# Patient Record
Sex: MALE | Race: WHITE | Employment: OTHER | URBAN - METROPOLITAN AREA
[De-identification: names, ages, dates, MRNs, and addresses within clinical notes are randomized per-mention and may not be internally consistent; named-entity substitution may affect disease eponyms.]

---

## 2020-01-16 ENCOUNTER — OFFICE VISIT (OUTPATIENT)
Dept: SURGERY | Age: 80
End: 2020-01-16
Payer: COMMERCIAL

## 2020-01-16 ENCOUNTER — TELEPHONE (OUTPATIENT)
Dept: SURGERY | Age: 80
End: 2020-01-16

## 2020-01-16 VITALS
DIASTOLIC BLOOD PRESSURE: 76 MMHG | HEART RATE: 63 BPM | SYSTOLIC BLOOD PRESSURE: 133 MMHG | OXYGEN SATURATION: 97 % | WEIGHT: 180 LBS | HEIGHT: 70 IN | TEMPERATURE: 97.8 F | BODY MASS INDEX: 25.77 KG/M2

## 2020-01-16 PROCEDURE — 99204 OFFICE O/P NEW MOD 45 MIN: CPT | Performed by: SURGERY

## 2020-01-16 RX ORDER — WARFARIN SODIUM 7.5 MG/1
7.5 TABLET ORAL DAILY
COMMUNITY

## 2020-01-16 RX ORDER — LISINOPRIL 5 MG/1
5 TABLET ORAL NIGHTLY
COMMUNITY

## 2020-01-16 SDOH — HEALTH STABILITY: MENTAL HEALTH: HOW MANY STANDARD DRINKS CONTAINING ALCOHOL DO YOU HAVE ON A TYPICAL DAY?: 1 OR 2

## 2020-01-16 SDOH — HEALTH STABILITY: MENTAL HEALTH: HOW OFTEN DO YOU HAVE A DRINK CONTAINING ALCOHOL?: 4 OR MORE TIMES A WEEK

## 2020-01-16 NOTE — PROGRESS NOTES
connections:     Talks on phone: Not on file     Gets together: Not on file     Attends Sikh service: Not on file     Active member of club or organization: Not on file     Attends meetings of clubs or organizations: Not on file     Relationship status: Not on file    Intimate partner violence:     Fear of current or ex partner: Not on file     Emotionally abused: Not on file     Physically abused: Not on file     Forced sexual activity: Not on file   Other Topics Concern    Not on file   Social History Narrative    Not on file           Review of Systems  Review of Systems -  General ROS: negative for - chills, fatigue or malaise  ENT ROS: negative for - hearing change, nasal congestion or nasal discharge  Allergy and Immunology ROS: negative for - hives, itchy/watery eyes or nasal congestion  Hematological and Lymphatic ROS: negative for - blood clots, blood transfusions, bruising or fatigue  Endocrine ROS: negative for - malaise/lethargy, mood swings, palpitations or polydipsia/polyuria  Breast ROS: negative for - new or changing breast lumps or nipple changes  Respiratory ROS: negative for - sputum changes, stridor, tachypnea or wheezing  Cardiovascular ROS: negative for - irregular heartbeat, loss of consciousness, murmur or orthopnea  Gastrointestinal ROS: negative for - constipation, diarrhea, gas/bloating, heartburn or hematemesis  Genito-Urinary ROS: negative for -  genital discharge, genital ulcers or hematuria  Musculoskeletal ROS: negative for - gait disturbance, muscle pain or muscular weakness    Physical exam:   /76 (Site: Left Upper Arm, Position: Sitting, Cuff Size: Medium Adult)   Pulse 63   Temp 97.8 °F (36.6 °C) (Oral)   Ht 5' 9.5\" (1.765 m)   Wt 180 lb (81.6 kg)   SpO2 97%   BMI 26.20 kg/m²   General appearance:  NAD  Pyscho/social: negative for tremors and hallucinations  Head: NCAT, PERRLA, EOMI, red conjunctiva  Neck: supple, no masses  Lungs: CTAB, equal chest rise bilateral  Heart: Reg rate  Abdomen: soft, minimally tender, nondistended, left  reducible hernia   Skin; no lesions  Gu: no cva tenderness  Extremities: extremities normal, atraumatic, no cyanosis or edema    Assessment:  70 y.o. male with recurrent left inguinal hernia     Plan: To OR   Discussed the risk, benefits and alternatives of surgery including wound infections, bleeding, scar and hernia formation and the risks of general anesthetic including MI, CVA, sudden death or reactions to anesthetic medications. The patient understands the risks and alternatives and the possibility of converting to an open procedure. All questions were answered to the patient's satisfaction and they freely signed the consent.         Raymond Roth MD  11:29 AM  1/16/2020

## 2020-01-20 ENCOUNTER — PREP FOR PROCEDURE (OUTPATIENT)
Dept: SURGERY | Age: 80
End: 2020-01-20

## 2020-01-20 RX ORDER — SODIUM CHLORIDE, SODIUM LACTATE, POTASSIUM CHLORIDE, CALCIUM CHLORIDE 600; 310; 30; 20 MG/100ML; MG/100ML; MG/100ML; MG/100ML
INJECTION, SOLUTION INTRAVENOUS CONTINUOUS
Status: CANCELLED | OUTPATIENT
Start: 2020-01-20

## 2020-01-20 RX ORDER — SODIUM CHLORIDE 0.9 % (FLUSH) 0.9 %
10 SYRINGE (ML) INJECTION EVERY 12 HOURS SCHEDULED
Status: CANCELLED | OUTPATIENT
Start: 2020-01-20

## 2020-01-20 RX ORDER — SODIUM CHLORIDE 0.9 % (FLUSH) 0.9 %
10 SYRINGE (ML) INJECTION PRN
Status: CANCELLED | OUTPATIENT
Start: 2020-01-20

## 2020-01-21 ENCOUNTER — TELEPHONE (OUTPATIENT)
Dept: SURGERY | Age: 80
End: 2020-01-21

## 2020-01-23 ENCOUNTER — HOSPITAL ENCOUNTER (OUTPATIENT)
Dept: PREADMISSION TESTING | Age: 80
Discharge: HOME | End: 2020-01-23
Payer: MEDICARE

## 2020-01-23 VITALS
WEIGHT: 184.13 LBS | OXYGEN SATURATION: 95 % | HEIGHT: 70 IN | HEART RATE: 79 BPM | RESPIRATION RATE: 16 BRPM | BODY MASS INDEX: 26.36 KG/M2 | DIASTOLIC BLOOD PRESSURE: 58 MMHG | SYSTOLIC BLOOD PRESSURE: 138 MMHG | TEMPERATURE: 97.3 F

## 2020-01-23 LAB
ANION GAP SERPL CALCULATED.3IONS-SCNC: 12 MMOL/L (ref 7–16)
APTT: 36.4 SEC (ref 24.5–35.1)
BUN BLDV-MCNC: 17 MG/DL (ref 8–23)
CALCIUM SERPL-MCNC: 9.5 MG/DL (ref 8.6–10.2)
CHLORIDE BLD-SCNC: 101 MMOL/L (ref 98–107)
CO2: 26 MMOL/L (ref 22–29)
CREAT SERPL-MCNC: 1 MG/DL (ref 0.7–1.2)
GFR AFRICAN AMERICAN: >60
GFR NON-AFRICAN AMERICAN: >60 ML/MIN/1.73
GLUCOSE BLD-MCNC: 138 MG/DL (ref 74–99)
HCT VFR BLD CALC: 40.2 % (ref 37–54)
HEMOGLOBIN: 13.4 G/DL (ref 12.5–16.5)
INR BLD: 1.8
MCH RBC QN AUTO: 30.7 PG (ref 26–35)
MCHC RBC AUTO-ENTMCNC: 33.3 % (ref 32–34.5)
MCV RBC AUTO: 92 FL (ref 80–99.9)
PDW BLD-RTO: 13.2 FL (ref 11.5–15)
PLATELET # BLD: 157 E9/L (ref 130–450)
PMV BLD AUTO: 10 FL (ref 7–12)
POTASSIUM REFLEX MAGNESIUM: 4.1 MMOL/L (ref 3.5–5)
PROTHROMBIN TIME: 20.7 SEC (ref 9.3–12.4)
RBC # BLD: 4.37 E12/L (ref 3.8–5.8)
SODIUM BLD-SCNC: 139 MMOL/L (ref 132–146)
WBC # BLD: 5.8 E9/L (ref 4.5–11.5)

## 2020-01-23 PROCEDURE — 85027 COMPLETE CBC AUTOMATED: CPT

## 2020-01-23 PROCEDURE — 85730 THROMBOPLASTIN TIME PARTIAL: CPT

## 2020-01-23 PROCEDURE — 36415 COLL VENOUS BLD VENIPUNCTURE: CPT

## 2020-01-23 PROCEDURE — 93005 ELECTROCARDIOGRAM TRACING: CPT | Performed by: ANESTHESIOLOGY

## 2020-01-23 PROCEDURE — 80048 BASIC METABOLIC PNL TOTAL CA: CPT

## 2020-01-23 PROCEDURE — 85610 PROTHROMBIN TIME: CPT

## 2020-01-23 ASSESSMENT — PAIN DESCRIPTION - LOCATION: LOCATION: GROIN

## 2020-01-23 ASSESSMENT — PAIN SCALES - GENERAL: PAINLEVEL_OUTOF10: 1

## 2020-01-23 ASSESSMENT — PAIN DESCRIPTION - ONSET: ONSET: ON-GOING

## 2020-01-23 ASSESSMENT — PAIN DESCRIPTION - ORIENTATION: ORIENTATION: LEFT

## 2020-01-23 ASSESSMENT — PAIN DESCRIPTION - DESCRIPTORS: DESCRIPTORS: PRESSURE

## 2020-01-23 ASSESSMENT — PAIN DESCRIPTION - PAIN TYPE: TYPE: CHRONIC PAIN

## 2020-01-23 NOTE — PROGRESS NOTES
no eye makeup) or nail polish on your fingers or toes. 11. DO NOT wear any jewelry or piercings on day of surgery. All body piercing jewelry must be removed. 12. Shower the night before surgery with _x__Antibacterial soap /AMISH WIPES________    13. TOTAL JOINT REPLACEMENT/HYSTERECTOMY PATIENTS ONLY---Remember to bring Blood Bank bracelet to the hospital on the day of surgery. 14. If you have a Living Will and Durable Power of  for Healthcare, please bring in a copy. 15. If appropriate bring crutches, inspirex, WALKER, CANE etc... 12. Notify your Surgeon if you develop any illness between now and surgery time, cough, cold, fever, sore throat, nausea, vomiting, etc.  Please notify your surgeon if you experience dizziness, shortness of breath or blurred vision between now & the time of your surgery. 17. If you have _x__dentures, they will be removed before going to the OR; we will provide you a container. If you wear ___contact lenses or _x__glasses, they will be removed; please bring a case for them. 18. To provide excellent care visitors will be limited to 2 in the room at any given time. 19. Please bring picture ID and insurance card. 20. Sleep apnea patients need to bring CPAP AND SETTINGS to hospital on day of surgery. 21. During flu season no children under the age of 15 are permitted in the hospital for the safety of all patients. 22. Other                  Please call AMBULATORY CARE if you have any further questions.    [facility]     [address]

## 2020-01-24 LAB
EKG ATRIAL RATE: 67 BPM
EKG P AXIS: 70 DEGREES
EKG P-R INTERVAL: 172 MS
EKG Q-T INTERVAL: 434 MS
EKG QRS DURATION: 68 MS
EKG QTC CALCULATION (BAZETT): 458 MS
EKG R AXIS: 54 DEGREES
EKG T AXIS: 86 DEGREES
EKG VENTRICULAR RATE: 67 BPM

## 2020-01-24 PROCEDURE — 93010 ELECTROCARDIOGRAM REPORT: CPT | Performed by: INTERNAL MEDICINE

## 2020-01-29 ENCOUNTER — HOSPITAL ENCOUNTER (OUTPATIENT)
Age: 80
Setting detail: OUTPATIENT SURGERY
Discharge: HOME | End: 2020-01-29
Attending: SURGERY | Admitting: SURGERY
Payer: MEDICARE

## 2020-01-29 ENCOUNTER — ANESTHESIA (OUTPATIENT)
Dept: OPERATING ROOM | Age: 80
End: 2020-01-29
Payer: MEDICARE

## 2020-01-29 ENCOUNTER — ANESTHESIA EVENT (OUTPATIENT)
Dept: OPERATING ROOM | Age: 80
End: 2020-01-29
Payer: MEDICARE

## 2020-01-29 VITALS
SYSTOLIC BLOOD PRESSURE: 130 MMHG | TEMPERATURE: 96.9 F | OXYGEN SATURATION: 94 % | WEIGHT: 186 LBS | BODY MASS INDEX: 26.63 KG/M2 | RESPIRATION RATE: 16 BRPM | HEIGHT: 70 IN | HEART RATE: 66 BPM | DIASTOLIC BLOOD PRESSURE: 70 MMHG

## 2020-01-29 VITALS
SYSTOLIC BLOOD PRESSURE: 187 MMHG | DIASTOLIC BLOOD PRESSURE: 111 MMHG | OXYGEN SATURATION: 99 % | RESPIRATION RATE: 1 BRPM | TEMPERATURE: 95.2 F

## 2020-01-29 LAB
APTT: 31.8 SEC (ref 24.5–35.1)
INR BLD: 1
PROTHROMBIN TIME: 11.3 SEC (ref 9.3–12.4)

## 2020-01-29 PROCEDURE — C1781 MESH (IMPLANTABLE): HCPCS | Performed by: SURGERY

## 2020-01-29 PROCEDURE — 2500000003 HC RX 250 WO HCPCS: Performed by: SURGERY

## 2020-01-29 PROCEDURE — 3600000014 HC SURGERY LEVEL 4 ADDTL 15MIN: Performed by: SURGERY

## 2020-01-29 PROCEDURE — 7100000000 HC PACU RECOVERY - FIRST 15 MIN: Performed by: SURGERY

## 2020-01-29 PROCEDURE — 6360000002 HC RX W HCPCS: Performed by: SURGERY

## 2020-01-29 PROCEDURE — 49651 LAP ING HERNIA REPAIR RECUR: CPT | Performed by: SURGERY

## 2020-01-29 PROCEDURE — 36415 COLL VENOUS BLD VENIPUNCTURE: CPT

## 2020-01-29 PROCEDURE — 2580000003 HC RX 258: Performed by: SURGERY

## 2020-01-29 PROCEDURE — 6360000002 HC RX W HCPCS: Performed by: ANESTHESIOLOGY

## 2020-01-29 PROCEDURE — 6360000002 HC RX W HCPCS

## 2020-01-29 PROCEDURE — 7100000001 HC PACU RECOVERY - ADDTL 15 MIN: Performed by: SURGERY

## 2020-01-29 PROCEDURE — 7100000010 HC PHASE II RECOVERY - FIRST 15 MIN: Performed by: SURGERY

## 2020-01-29 PROCEDURE — 3700000001 HC ADD 15 MINUTES (ANESTHESIA): Performed by: SURGERY

## 2020-01-29 PROCEDURE — 85730 THROMBOPLASTIN TIME PARTIAL: CPT

## 2020-01-29 PROCEDURE — 2500000003 HC RX 250 WO HCPCS: Performed by: NURSE ANESTHETIST, CERTIFIED REGISTERED

## 2020-01-29 PROCEDURE — 2720000010 HC SURG SUPPLY STERILE: Performed by: SURGERY

## 2020-01-29 PROCEDURE — 6360000002 HC RX W HCPCS: Performed by: NURSE ANESTHETIST, CERTIFIED REGISTERED

## 2020-01-29 PROCEDURE — 3700000000 HC ANESTHESIA ATTENDED CARE: Performed by: SURGERY

## 2020-01-29 PROCEDURE — 7100000011 HC PHASE II RECOVERY - ADDTL 15 MIN: Performed by: SURGERY

## 2020-01-29 PROCEDURE — 3600000004 HC SURGERY LEVEL 4 BASE: Performed by: SURGERY

## 2020-01-29 PROCEDURE — 2709999900 HC NON-CHARGEABLE SUPPLY: Performed by: SURGERY

## 2020-01-29 PROCEDURE — 85610 PROTHROMBIN TIME: CPT

## 2020-01-29 DEVICE — MESH SURG XL W4.8XL6.7IN L L PORE LTWT FOR INGUINAL HERN: Type: IMPLANTABLE DEVICE | Site: INGUINAL | Status: FUNCTIONAL

## 2020-01-29 RX ORDER — TRAMADOL HYDROCHLORIDE 50 MG/1
50 TABLET ORAL EVERY 6 HOURS PRN
Qty: 12 TABLET | Refills: 0 | Status: SHIPPED | OUTPATIENT
Start: 2020-01-29 | End: 2020-02-01

## 2020-01-29 RX ORDER — MIDAZOLAM HYDROCHLORIDE 1 MG/ML
INJECTION INTRAMUSCULAR; INTRAVENOUS PRN
Status: DISCONTINUED | OUTPATIENT
Start: 2020-01-29 | End: 2020-01-29 | Stop reason: SDUPTHER

## 2020-01-29 RX ORDER — MEPERIDINE HYDROCHLORIDE 25 MG/ML
12.5 INJECTION INTRAMUSCULAR; INTRAVENOUS; SUBCUTANEOUS EVERY 5 MIN PRN
Status: DISCONTINUED | OUTPATIENT
Start: 2020-01-29 | End: 2020-01-29 | Stop reason: HOSPADM

## 2020-01-29 RX ORDER — IBUPROFEN 800 MG/1
800 TABLET ORAL EVERY 6 HOURS PRN
Qty: 20 TABLET | Refills: 0 | Status: SHIPPED | OUTPATIENT
Start: 2020-01-29

## 2020-01-29 RX ORDER — PROPOFOL 10 MG/ML
INJECTION, EMULSION INTRAVENOUS PRN
Status: DISCONTINUED | OUTPATIENT
Start: 2020-01-29 | End: 2020-01-29 | Stop reason: SDUPTHER

## 2020-01-29 RX ORDER — HYDROMORPHONE HYDROCHLORIDE 1 MG/ML
0.5 INJECTION, SOLUTION INTRAMUSCULAR; INTRAVENOUS; SUBCUTANEOUS EVERY 5 MIN PRN
Status: DISCONTINUED | OUTPATIENT
Start: 2020-01-29 | End: 2020-01-29 | Stop reason: HOSPADM

## 2020-01-29 RX ORDER — HYDRALAZINE HYDROCHLORIDE 20 MG/ML
5 INJECTION INTRAMUSCULAR; INTRAVENOUS EVERY 10 MIN PRN
Status: DISCONTINUED | OUTPATIENT
Start: 2020-01-29 | End: 2020-01-29 | Stop reason: HOSPADM

## 2020-01-29 RX ORDER — SODIUM CHLORIDE, SODIUM LACTATE, POTASSIUM CHLORIDE, CALCIUM CHLORIDE 600; 310; 30; 20 MG/100ML; MG/100ML; MG/100ML; MG/100ML
INJECTION, SOLUTION INTRAVENOUS CONTINUOUS
Status: DISCONTINUED | OUTPATIENT
Start: 2020-01-29 | End: 2020-01-29 | Stop reason: HOSPADM

## 2020-01-29 RX ORDER — HYDROMORPHONE HYDROCHLORIDE 1 MG/ML
0.25 INJECTION, SOLUTION INTRAMUSCULAR; INTRAVENOUS; SUBCUTANEOUS EVERY 5 MIN PRN
Status: DISCONTINUED | OUTPATIENT
Start: 2020-01-29 | End: 2020-01-29 | Stop reason: HOSPADM

## 2020-01-29 RX ORDER — LABETALOL HYDROCHLORIDE 5 MG/ML
5 INJECTION, SOLUTION INTRAVENOUS EVERY 10 MIN PRN
Status: DISCONTINUED | OUTPATIENT
Start: 2020-01-29 | End: 2020-01-29 | Stop reason: HOSPADM

## 2020-01-29 RX ORDER — FENTANYL CITRATE 50 UG/ML
INJECTION, SOLUTION INTRAMUSCULAR; INTRAVENOUS PRN
Status: DISCONTINUED | OUTPATIENT
Start: 2020-01-29 | End: 2020-01-29 | Stop reason: SDUPTHER

## 2020-01-29 RX ORDER — DEXAMETHASONE SODIUM PHOSPHATE 10 MG/ML
INJECTION, SOLUTION INTRAMUSCULAR; INTRAVENOUS PRN
Status: DISCONTINUED | OUTPATIENT
Start: 2020-01-29 | End: 2020-01-29 | Stop reason: SDUPTHER

## 2020-01-29 RX ORDER — LIDOCAINE HYDROCHLORIDE 20 MG/ML
INJECTION, SOLUTION INTRAVENOUS PRN
Status: DISCONTINUED | OUTPATIENT
Start: 2020-01-29 | End: 2020-01-29 | Stop reason: SDUPTHER

## 2020-01-29 RX ORDER — ONDANSETRON 2 MG/ML
INJECTION INTRAMUSCULAR; INTRAVENOUS PRN
Status: DISCONTINUED | OUTPATIENT
Start: 2020-01-29 | End: 2020-01-29 | Stop reason: SDUPTHER

## 2020-01-29 RX ORDER — BUPIVACAINE HYDROCHLORIDE AND EPINEPHRINE 2.5; 5 MG/ML; UG/ML
INJECTION, SOLUTION EPIDURAL; INFILTRATION; INTRACAUDAL; PERINEURAL PRN
Status: DISCONTINUED | OUTPATIENT
Start: 2020-01-29 | End: 2020-01-29 | Stop reason: ALTCHOICE

## 2020-01-29 RX ORDER — ONDANSETRON 2 MG/ML
4 INJECTION INTRAMUSCULAR; INTRAVENOUS
Status: DISCONTINUED | OUTPATIENT
Start: 2020-01-29 | End: 2020-01-29 | Stop reason: HOSPADM

## 2020-01-29 RX ORDER — SODIUM CHLORIDE 0.9 % (FLUSH) 0.9 %
10 SYRINGE (ML) INJECTION EVERY 12 HOURS SCHEDULED
Status: DISCONTINUED | OUTPATIENT
Start: 2020-01-29 | End: 2020-01-29 | Stop reason: HOSPADM

## 2020-01-29 RX ORDER — NEOSTIGMINE METHYLSULFATE 1 MG/ML
INJECTION, SOLUTION INTRAVENOUS PRN
Status: DISCONTINUED | OUTPATIENT
Start: 2020-01-29 | End: 2020-01-29 | Stop reason: SDUPTHER

## 2020-01-29 RX ORDER — GLYCOPYRROLATE 1 MG/5 ML
SYRINGE (ML) INTRAVENOUS PRN
Status: DISCONTINUED | OUTPATIENT
Start: 2020-01-29 | End: 2020-01-29 | Stop reason: SDUPTHER

## 2020-01-29 RX ORDER — PROMETHAZINE HYDROCHLORIDE 25 MG/ML
6.25 INJECTION, SOLUTION INTRAMUSCULAR; INTRAVENOUS
Status: DISCONTINUED | OUTPATIENT
Start: 2020-01-29 | End: 2020-01-29 | Stop reason: HOSPADM

## 2020-01-29 RX ORDER — CEFAZOLIN SODIUM 2 G/50ML
2 SOLUTION INTRAVENOUS
Status: COMPLETED | OUTPATIENT
Start: 2020-01-29 | End: 2020-01-29

## 2020-01-29 RX ORDER — SODIUM CHLORIDE 0.9 % (FLUSH) 0.9 %
10 SYRINGE (ML) INJECTION PRN
Status: DISCONTINUED | OUTPATIENT
Start: 2020-01-29 | End: 2020-01-29 | Stop reason: HOSPADM

## 2020-01-29 RX ORDER — ROCURONIUM BROMIDE 10 MG/ML
INJECTION, SOLUTION INTRAVENOUS PRN
Status: DISCONTINUED | OUTPATIENT
Start: 2020-01-29 | End: 2020-01-29 | Stop reason: SDUPTHER

## 2020-01-29 RX ADMIN — SODIUM CHLORIDE, POTASSIUM CHLORIDE, SODIUM LACTATE AND CALCIUM CHLORIDE: 600; 310; 30; 20 INJECTION, SOLUTION INTRAVENOUS at 06:19

## 2020-01-29 RX ADMIN — HYDROMORPHONE HYDROCHLORIDE 0.25 MG: 1 INJECTION, SOLUTION INTRAMUSCULAR; INTRAVENOUS; SUBCUTANEOUS at 09:33

## 2020-01-29 RX ADMIN — HYDROMORPHONE HYDROCHLORIDE 0.25 MG: 1 INJECTION, SOLUTION INTRAMUSCULAR; INTRAVENOUS; SUBCUTANEOUS at 09:25

## 2020-01-29 RX ADMIN — FENTANYL CITRATE 50 MCG: 50 INJECTION, SOLUTION INTRAMUSCULAR; INTRAVENOUS at 08:11

## 2020-01-29 RX ADMIN — ONDANSETRON HYDROCHLORIDE 4 MG: 2 INJECTION, SOLUTION INTRAMUSCULAR; INTRAVENOUS at 08:25

## 2020-01-29 RX ADMIN — FENTANYL CITRATE 50 MCG: 50 INJECTION, SOLUTION INTRAMUSCULAR; INTRAVENOUS at 08:32

## 2020-01-29 RX ADMIN — LIDOCAINE HYDROCHLORIDE 50 MG: 20 INJECTION, SOLUTION INTRAVENOUS at 08:03

## 2020-01-29 RX ADMIN — Medication 0.6 MG: at 08:27

## 2020-01-29 RX ADMIN — Medication 3 MG: at 08:27

## 2020-01-29 RX ADMIN — SODIUM CHLORIDE, POTASSIUM CHLORIDE, SODIUM LACTATE AND CALCIUM CHLORIDE: 600; 310; 30; 20 INJECTION, SOLUTION INTRAVENOUS at 08:37

## 2020-01-29 RX ADMIN — FENTANYL CITRATE 50 MCG: 50 INJECTION, SOLUTION INTRAMUSCULAR; INTRAVENOUS at 08:28

## 2020-01-29 RX ADMIN — MIDAZOLAM 2 MG: 1 INJECTION INTRAMUSCULAR; INTRAVENOUS at 07:58

## 2020-01-29 RX ADMIN — Medication 30 MG: at 08:03

## 2020-01-29 RX ADMIN — DEXAMETHASONE SODIUM PHOSPHATE 10 MG: 10 INJECTION, SOLUTION INTRAMUSCULAR; INTRAVENOUS at 08:03

## 2020-01-29 RX ADMIN — PROPOFOL 200 MG: 10 INJECTION, EMULSION INTRAVENOUS at 08:03

## 2020-01-29 RX ADMIN — FENTANYL CITRATE 50 MCG: 50 INJECTION, SOLUTION INTRAMUSCULAR; INTRAVENOUS at 08:03

## 2020-01-29 RX ADMIN — CEFAZOLIN SODIUM 2 G: 2 SOLUTION INTRAVENOUS at 07:58

## 2020-01-29 RX ADMIN — FENTANYL CITRATE 50 MCG: 50 INJECTION, SOLUTION INTRAMUSCULAR; INTRAVENOUS at 08:24

## 2020-01-29 RX ADMIN — SODIUM CHLORIDE, POTASSIUM CHLORIDE, SODIUM LACTATE AND CALCIUM CHLORIDE: 600; 310; 30; 20 INJECTION, SOLUTION INTRAVENOUS at 07:58

## 2020-01-29 ASSESSMENT — PULMONARY FUNCTION TESTS
PIF_VALUE: 1
PIF_VALUE: 15
PIF_VALUE: 0
PIF_VALUE: 13
PIF_VALUE: 3
PIF_VALUE: 3
PIF_VALUE: 19
PIF_VALUE: 7
PIF_VALUE: 19
PIF_VALUE: 15
PIF_VALUE: 16
PIF_VALUE: 2
PIF_VALUE: 0
PIF_VALUE: 14
PIF_VALUE: 19
PIF_VALUE: 15
PIF_VALUE: 25
PIF_VALUE: 0
PIF_VALUE: 13
PIF_VALUE: 18
PIF_VALUE: 14
PIF_VALUE: 14
PIF_VALUE: 0
PIF_VALUE: 2
PIF_VALUE: 19
PIF_VALUE: 14
PIF_VALUE: 14
PIF_VALUE: 19
PIF_VALUE: 1
PIF_VALUE: 19
PIF_VALUE: 21

## 2020-01-29 ASSESSMENT — PAIN DESCRIPTION - PROGRESSION
CLINICAL_PROGRESSION: NOT CHANGED
CLINICAL_PROGRESSION: NOT CHANGED
CLINICAL_PROGRESSION: GRADUALLY WORSENING
CLINICAL_PROGRESSION: NOT CHANGED

## 2020-01-29 ASSESSMENT — PAIN DESCRIPTION - LOCATION
LOCATION: GROIN
LOCATION: ABDOMEN
LOCATION: ABDOMEN
LOCATION: GROIN
LOCATION: ABDOMEN

## 2020-01-29 ASSESSMENT — PAIN SCALES - GENERAL
PAINLEVEL_OUTOF10: 4
PAINLEVEL_OUTOF10: 5
PAINLEVEL_OUTOF10: 0
PAINLEVEL_OUTOF10: 5
PAINLEVEL_OUTOF10: 0
PAINLEVEL_OUTOF10: 0

## 2020-01-29 ASSESSMENT — PAIN DESCRIPTION - DESCRIPTORS
DESCRIPTORS: SORE
DESCRIPTORS: SORE
DESCRIPTORS: ACHING

## 2020-01-29 ASSESSMENT — PAIN DESCRIPTION - PAIN TYPE
TYPE: SURGICAL PAIN

## 2020-01-29 ASSESSMENT — LIFESTYLE VARIABLES: SMOKING_STATUS: 1

## 2020-01-29 ASSESSMENT — PAIN DESCRIPTION - ORIENTATION
ORIENTATION: LEFT
ORIENTATION: LEFT

## 2020-01-29 ASSESSMENT — PAIN - FUNCTIONAL ASSESSMENT: PAIN_FUNCTIONAL_ASSESSMENT: 0-10

## 2020-01-29 NOTE — ANESTHESIA POSTPROCEDURE EVALUATION
Department of Anesthesiology  Postprocedure Note    Patient: Chela Tovar  MRN: [id number]  YOB: 1940  Date of evaluation: 1/29/2020  Time:  10:26 AM     Procedure Summary     Date:  01/29/20 Room / Location:  [address] [place] [address] 06 / [facility]    Anesthesia Start:  3550 Anesthesia Stop:  6348    Procedure:  LAPAROSCOPIC LEFT INGUINAL HERNIA REPAIR WITH MESH (Left ) Diagnosis:  (LEFT INGUINAL HERNIA)    Surgeon:  Brandin Parks MD Responsible Provider:  Reg Walters MD    Anesthesia Type:  general ASA Status:  3          Anesthesia Type: general    Amaury Phase I: Amaury Score: 10    Amaury Phase II: Amaury Score: 10    Last vitals: Reviewed and per EMR flowsheets.        Anesthesia Post Evaluation    Patient location during evaluation: PACU  Patient participation: complete - patient participated  Level of consciousness: awake and alert  Pain score: 0  Airway patency: patent  Nausea & Vomiting: no vomiting and no nausea  Complications: no  Cardiovascular status: hemodynamically stable  Respiratory status: acceptable  Hydration status: stable

## 2020-01-29 NOTE — ANESTHESIA PRE PROCEDURE
Department of Anesthesiology  Preprocedure Note       Name:  Kristina Mojica   Age:  70 y.o.  :  1940                                          MRN:  [id number]         Date:  2020      Surgeon: Alyssia Presley):  Elina Toro MD    Procedure: LAPAROSCOPIC, POSS OPEN LEFT INGUINAL HERNIA REPAIR WITH MESH (Left )    Medications prior to admission:   Prior to Admission medications    Medication Sig Start Date End Date Taking? Authorizing Provider   lisinopril (PRINIVIL;ZESTRIL) 5 MG tablet Take 5 mg by mouth nightly    Yes Historical Provider, MD   Simvastatin (ZOCOR PO) Take 40 mg by mouth nightly    Yes Historical Provider, MD   warfarin (COUMADIN) 7.5 MG tablet Take 7.5 mg by mouth daily     Historical Provider, MD       Current medications:    Current Facility-Administered Medications   Medication Dose Route Frequency Provider Last Rate Last Dose    ceFAZolin (ANCEF) 2 g in dextrose 3 % 50 mL IVPB (duplex)  2 g Intravenous On Call to [address] [place] [address], MD        lactated ringers infusion   Intravenous Continuous Elina Toro  mL/hr at 20 3581      sodium chloride flush 0.9 % injection 10 mL  10 mL Intravenous 2 times per day Elina Toro MD        sodium chloride flush 0.9 % injection 10 mL  10 mL Intravenous PRN Elina Toro MD           Allergies:  No Known Allergies    Problem List:  There is no problem list on file for this patient.       Past Medical History:        Diagnosis Date    Cerebral artery occlusion with cerebral infarction ([address]     left side weaker than right    [place] (hard of hearing)     Hx of blood clots     Hyperlipidemia     Hypertension        Past Surgical History:        Procedure Laterality Date    FRACTURE SURGERY      hand left    HERNIA REPAIR      TONSILLECTOMY         Social History:    Social History     Tobacco Use    Smoking status: Heavy Tobacco Smoker     Packs/day: 1.00     Types: Cigarettes    Smokeless tobacco: Never Used   Substance Use Topics    Alcohol use: Yes     Alcohol/week: 5.0 standard drinks     Types: 5 Cans of beer per week     Frequency: 4 or more times a week     Drinks per session: 1 or 2     Binge frequency: Daily or almost daily     Comment: occas. Ready to quit: Not Answered  Counseling given: Not Answered      Vital Signs (Current):   Vitals:    01/29/20 0601   BP: 134/68   Pulse: 77   Resp: 16   Temp: 97.4 °F (36.3 °C)   TempSrc: Temporal   SpO2: 95%   Weight: 186 lb (84.4 kg)   Height: 5' 9.5\" (1.765 m)                                              BP Readings from Last 3 Encounters:   01/29/20 134/68   01/23/20 (!) 138/58   01/16/20 133/76       NPO Status: Time of last liquid consumption: 2100                        Time of last solid consumption: 2100                        Date of last liquid consumption: 01/28/20                        Date of last solid food consumption: 01/28/20    BMI:   Wt Readings from Last 3 Encounters:   01/29/20 186 lb (84.4 kg)   01/23/20 184 lb 2 oz (83.5 kg)   01/16/20 180 lb (81.6 kg)     Body mass index is 27.07 kg/m². CBC:   Lab Results   Component Value Date    WBC 5.8 01/23/2020    RBC 4.37 01/23/2020    HGB 13.4 01/23/2020    HCT 40.2 01/23/2020    MCV 92.0 01/23/2020    RDW 13.2 01/23/2020     01/23/2020       CMP:   Lab Results   Component Value Date     01/23/2020    K 4.1 01/23/2020     01/23/2020    CO2 26 01/23/2020    BUN 17 01/23/2020    CREATININE 1.0 01/23/2020    GFRAA >60 01/23/2020    LABGLOM >60 01/23/2020    GLUCOSE 138 01/23/2020    PROT 7.3 09/20/2017    CALCIUM 9.5 01/23/2020    BILITOT 0.4 09/20/2017    ALKPHOS 43 09/20/2017    AST 14 09/20/2017    ALT 11 09/20/2017       POC Tests: No results for input(s): POCGLU, POCNA, POCK, POCCL, POCBUN, POCHEMO, POCHCT in the last 72 hours.     Coags:   Lab Results   Component Value Date    PROTIME 11.3 01/29/2020    INR 1.0 01/29/2020    APTT 31.8 01/29/2020       HCG (If

## 2020-01-29 NOTE — OP NOTE
DATE OF PROCEDURE:  1/29/2020    SURGEON: LES Hyltone: edith. PREOPERATIVE DIAGNOSES: recurrent left inguinal hernia, possible bilateral     POSTOPERATIVE DIAGNOSIS: recurrent left inguinal hernia      OPERATION: 1.)Laparoscopic transabdominal recurrent left inguinal hernia repair with   Mesh  2.) Excision of cord lipoma     ANESTHESIA: General.     ESTIMATED BLOOD LOSS: Minimal.     COMPLICATIONS: None. FLUIDS: Crystalloid. SPECIMEN: None. DISPOSITION: Discharged home. INDICATION FOR SURGERY: This is a 82-year-old male   who presented with left inguinal swelling and complaints of pain consistent with a left inguinal hernia that was verified with physical exam. We explained the risks and benefits,   potential outcomes and alternatives of treatment of the aforementioned   treatment, including risk of opening surgical site, infection, mesh infection   and needing removal, conversion to open procedure and he agreed to proceed understanding those risks and potential outcomes. PROCEDURE: The patient was brought into the operating suite, had bilateral   PCDs placed, was on preoperative antibiotics, was placed under general   anesthesia, and was then prepped and draped in normal sterile condition. Once this was done, a 5-mm incision was made infraumbilically. Veress needle   was passed into the peritoneum. Meniscus test verified proper location. CO2   was used to insufflate the abdomen to a pressure of 15 mmHg. At that time,   the Veress needle was removed and a 5-mm trocar was put into its place. A   laparoscope was introduced through this trocar and under direct visualization   with the laparoscope, 2 additional 5-mm trocars were placed, 1 on the right   abdomen 4 fingerbreadths lateral into the same line as the umbilicus, and   then a similar mirror image on the left side. Once all 3 ports were in   placed, we explored the abdomen.  There was an obvious left indirect inguinal hernia. We began our dissection by taking the scissors and making a peritoneal   incision more proximal on the abdominal wall from the hernia site. Once we   were able to get into the preperitoneal space, we bluntly dissected out the   preperitoneal space down to the hernia sac. The hernia sac was then bluntly   dissected free from the spermatic cord and its contents. A cord lipoma was dissected free and excised. We were able to   visualize the vas deferens and pay close attention to not injure this as well   as the vasculature of the cord contents. Once we were able to reduce the   hernia and free the peritoneum from the spermatic cord, and fully reduce the   hernia, we brought in a Bard 3DMax Light mesh, introduced it through one of   the trocars and then placed it into the left inguinal area. Once this was   done, it was tacked to the pubic tubercle medially and a few other   stabilizing tacks were placed throughout the mesh. These were paid close   attention to not place any so-called \"triangle of doom\" or \"triangle of pain\". Once this was completed, the peritoneum was then tacked upon itself to close   over the mesh to assure no bowel would come in contact with the mesh. Once this was completed and we were content with the placement of the mesh,   the pneumoperitoneum was evacuated. The trocars were removed. At that   point, the skin was approximated in all trocar sites with 4-0 Monocryl   sutures in a subcuticular fashion. The patient was woken up in stable   condition and taken to PACU for postoperative care.       Holden Ariza MD  8:28 AM  1/29/2020

## 2020-01-29 NOTE — H&P
Never   Lifestyle    Physical activity:       Days per week: Not on file       Minutes per session: Not on file    Stress: Not on file   Relationships    Social connections:       Talks on phone: Not on file       Gets together: Not on file       Attends Sabianist service: Not on file       Active member of club or organization: Not on file       Attends meetings of clubs or organizations: Not on file       Relationship status: Not on file    Intimate partner violence:       Fear of current or ex partner: Not on file       Emotionally abused: Not on file       Physically abused: Not on file       Forced sexual activity: Not on file   Other Topics Concern    Not on file   Social History Narrative    Not on file                  Review of Systems  Review of Systems -  General ROS: negative for - chills, fatigue or malaise  ENT ROS: negative for - hearing change, nasal congestion or nasal discharge  Allergy and Immunology ROS: negative for - hives, itchy/watery eyes or nasal congestion  Hematological and Lymphatic ROS: negative for - blood clots, blood transfusions, bruising or fatigue  Endocrine ROS: negative for - malaise/lethargy, mood swings, palpitations or polydipsia/polyuria  Breast ROS: negative for - new or changing breast lumps or nipple changes  Respiratory ROS: negative for - sputum changes, stridor, tachypnea or wheezing  Cardiovascular ROS: negative for - irregular heartbeat, loss of consciousness, murmur or orthopnea  Gastrointestinal ROS: negative for - constipation, diarrhea, gas/bloating, heartburn or hematemesis  Genito-Urinary ROS: negative for -  genital discharge, genital ulcers or hematuria  Musculoskeletal ROS: negative for - gait disturbance, muscle pain or muscular weakness     Physical exam:   /68   Pulse 77   Temp 97.4 °F (36.3 °C) (Temporal)   Resp 16   Ht 5' 9.5\" (1.765 m)   Wt 186 lb (84.4 kg)   SpO2 95%   BMI 27.07 kg/m²     General appearance:  NAD  Pyscho/social:

## 2020-02-13 ENCOUNTER — OFFICE VISIT (OUTPATIENT)
Dept: SURGERY | Age: 80
End: 2020-02-13

## 2020-02-13 VITALS
TEMPERATURE: 98.1 F | BODY MASS INDEX: 26.63 KG/M2 | DIASTOLIC BLOOD PRESSURE: 68 MMHG | WEIGHT: 186 LBS | HEART RATE: 71 BPM | HEIGHT: 70 IN | RESPIRATION RATE: 17 BRPM | SYSTOLIC BLOOD PRESSURE: 120 MMHG

## 2020-02-13 PROCEDURE — 99024 POSTOP FOLLOW-UP VISIT: CPT | Performed by: SURGERY

## 2023-11-12 ENCOUNTER — HOSPITAL ENCOUNTER (INPATIENT)
Age: 83
LOS: 4 days | Discharge: HOME | End: 2023-11-17
Attending: STUDENT IN AN ORGANIZED HEALTH CARE EDUCATION/TRAINING PROGRAM | Admitting: FAMILY MEDICINE
Payer: MEDICARE

## 2023-11-12 ENCOUNTER — APPOINTMENT (OUTPATIENT)
Dept: GENERAL RADIOLOGY | Age: 83
End: 2023-11-12
Payer: MEDICARE

## 2023-11-12 DIAGNOSIS — J18.9 PNEUMONIA DUE TO INFECTIOUS ORGANISM, UNSPECIFIED LATERALITY, UNSPECIFIED PART OF LUNG: ICD-10-CM

## 2023-11-12 DIAGNOSIS — R09.02 HYPOXIA: ICD-10-CM

## 2023-11-12 DIAGNOSIS — U07.1 COVID: Primary | ICD-10-CM

## 2023-11-12 PROCEDURE — 85610 PROTHROMBIN TIME: CPT

## 2023-11-12 PROCEDURE — 85379 FIBRIN DEGRADATION QUANT: CPT

## 2023-11-12 PROCEDURE — 80053 COMPREHEN METABOLIC PANEL: CPT

## 2023-11-12 PROCEDURE — 71045 X-RAY EXAM CHEST 1 VIEW: CPT

## 2023-11-12 PROCEDURE — 83605 ASSAY OF LACTIC ACID: CPT

## 2023-11-12 PROCEDURE — 87502 INFLUENZA DNA AMP PROBE: CPT

## 2023-11-12 PROCEDURE — 36415 COLL VENOUS BLD VENIPUNCTURE: CPT

## 2023-11-12 PROCEDURE — 85025 COMPLETE CBC W/AUTO DIFF WBC: CPT

## 2023-11-12 PROCEDURE — 87635 SARS-COV-2 COVID-19 AMP PRB: CPT

## 2023-11-12 PROCEDURE — 83735 ASSAY OF MAGNESIUM: CPT

## 2023-11-12 PROCEDURE — 84484 ASSAY OF TROPONIN QUANT: CPT

## 2023-11-12 PROCEDURE — 99285 EMERGENCY DEPT VISIT HI MDM: CPT

## 2023-11-12 PROCEDURE — 96365 THER/PROPH/DIAG IV INF INIT: CPT

## 2023-11-12 PROCEDURE — 93005 ELECTROCARDIOGRAM TRACING: CPT

## 2023-11-12 PROCEDURE — 96366 THER/PROPH/DIAG IV INF ADDON: CPT

## 2023-11-12 PROCEDURE — 96375 TX/PRO/DX INJ NEW DRUG ADDON: CPT

## 2023-11-13 ENCOUNTER — APPOINTMENT (OUTPATIENT)
Dept: CT IMAGING | Age: 83
End: 2023-11-13
Payer: MEDICARE

## 2023-11-13 PROBLEM — J12.82 PNEUMONIA DUE TO COVID-19 VIRUS: Status: ACTIVE | Noted: 2023-11-13

## 2023-11-13 PROBLEM — U07.1 PNEUMONIA DUE TO COVID-19 VIRUS: Status: ACTIVE | Noted: 2023-11-13

## 2023-11-13 PROBLEM — J96.01 ACUTE RESPIRATORY FAILURE WITH HYPOXIA (HCC): Status: ACTIVE | Noted: 2023-11-13

## 2023-11-13 LAB
ALBUMIN SERPL-MCNC: 4 G/DL (ref 3.5–5.2)
ALP SERPL-CCNC: 29 U/L (ref 40–129)
ALT SERPL-CCNC: 35 U/L (ref 0–40)
ANION GAP SERPL CALCULATED.3IONS-SCNC: 12 MMOL/L (ref 7–16)
AST SERPL-CCNC: 62 U/L (ref 0–39)
BASOPHILS # BLD: 0 K/UL (ref 0–0.2)
BASOPHILS NFR BLD: 0 % (ref 0–2)
BILIRUB SERPL-MCNC: 0.5 MG/DL (ref 0–1.2)
BUN SERPL-MCNC: 16 MG/DL (ref 6–23)
CALCIUM SERPL-MCNC: 8.2 MG/DL (ref 8.6–10.2)
CHLORIDE SERPL-SCNC: 100 MMOL/L (ref 98–107)
CO2 SERPL-SCNC: 20 MMOL/L (ref 22–29)
CREAT SERPL-MCNC: 1.1 MG/DL (ref 0.7–1.2)
CRP SERPL HS-MCNC: 204 MG/L (ref 0–5)
D DIMER: 1327 NG/ML DDU (ref 0–232)
EOSINOPHIL # BLD: 0 K/UL (ref 0.05–0.5)
EOSINOPHILS RELATIVE PERCENT: 0 % (ref 0–6)
ERYTHROCYTE [DISTWIDTH] IN BLOOD BY AUTOMATED COUNT: 14.3 % (ref 11.5–15)
FERRITIN SERPL-MCNC: 800 NG/ML
GFR SERPL CREATININE-BSD FRML MDRD: >60 ML/MIN/1.73M2
GLUCOSE SERPL-MCNC: 130 MG/DL (ref 74–99)
HCT VFR BLD AUTO: 31.5 % (ref 37–54)
HGB BLD-MCNC: 10.6 G/DL (ref 12.5–16.5)
INFLUENZA A BY PCR: NOT DETECTED
INFLUENZA B BY PCR: NOT DETECTED
INR PPP: 2.2
LACTATE BLDV-SCNC: 1 MMOL/L (ref 0.5–1.9)
LACTATE BLDV-SCNC: 1 MMOL/L (ref 0.5–2.2)
LACTATE BLDV-SCNC: 1.9 MMOL/L (ref 0.5–1.9)
LYMPHOCYTES NFR BLD: 0.77 K/UL (ref 1.5–4)
LYMPHOCYTES RELATIVE PERCENT: 12 % (ref 20–42)
MAGNESIUM SERPL-MCNC: 1.8 MG/DL (ref 1.6–2.6)
MCH RBC QN AUTO: 30.3 PG (ref 26–35)
MCHC RBC AUTO-ENTMCNC: 33.7 G/DL (ref 32–34.5)
MCV RBC AUTO: 90 FL (ref 80–99.9)
MONOCYTES NFR BLD: 0.22 K/UL (ref 0.1–0.95)
MONOCYTES NFR BLD: 4 % (ref 2–12)
NEUTROPHILS NFR BLD: 84 % (ref 43–80)
NEUTS SEG NFR BLD: 5.31 K/UL (ref 1.8–7.3)
PLATELET, FLUORESCENCE: 107 K/UL (ref 130–450)
PMV BLD AUTO: 12 FL (ref 7–12)
POTASSIUM SERPL-SCNC: 5 MMOL/L (ref 3.5–5)
PROCALCITONIN SERPL-MCNC: 0.27 NG/ML (ref 0–0.08)
PROCALCITONIN SERPL-MCNC: 0.31 NG/ML (ref 0–0.08)
PROT SERPL-MCNC: 7.1 G/DL (ref 6.4–8.3)
PROTHROMBIN TIME: 25.5 SEC (ref 9.3–12.4)
RBC # BLD AUTO: 3.5 M/UL (ref 3.8–5.8)
RBC # BLD: ABNORMAL 10*6/UL
SARS-COV-2 RDRP RESP QL NAA+PROBE: DETECTED
SODIUM SERPL-SCNC: 132 MMOL/L (ref 132–146)
SPECIMEN DESCRIPTION: ABNORMAL
TROPONIN I SERPL HS-MCNC: 26 NG/L (ref 0–11)
TROPONIN I SERPL HS-MCNC: 26 NG/L (ref 0–11)
WBC # BLD: ABNORMAL 10*3/UL
WBC OTHER # BLD: 6.3 K/UL (ref 4.5–11.5)

## 2023-11-13 PROCEDURE — 2500000003 HC RX 250 WO HCPCS

## 2023-11-13 PROCEDURE — 6360000002 HC RX W HCPCS: Performed by: STUDENT IN AN ORGANIZED HEALTH CARE EDUCATION/TRAINING PROGRAM

## 2023-11-13 PROCEDURE — 83605 ASSAY OF LACTIC ACID: CPT

## 2023-11-13 PROCEDURE — 2580000003 HC RX 258

## 2023-11-13 PROCEDURE — 1200000000 HC SEMI PRIVATE

## 2023-11-13 PROCEDURE — 6360000002 HC RX W HCPCS

## 2023-11-13 PROCEDURE — 6360000002 HC RX W HCPCS: Performed by: FAMILY MEDICINE

## 2023-11-13 PROCEDURE — 2700000000 HC OXYGEN THERAPY PER DAY

## 2023-11-13 PROCEDURE — 87081 CULTURE SCREEN ONLY: CPT

## 2023-11-13 PROCEDURE — 86140 C-REACTIVE PROTEIN: CPT

## 2023-11-13 PROCEDURE — 2060000000 HC ICU INTERMEDIATE R&B

## 2023-11-13 PROCEDURE — 87449 NOS EACH ORGANISM AG IA: CPT

## 2023-11-13 PROCEDURE — 36415 COLL VENOUS BLD VENIPUNCTURE: CPT

## 2023-11-13 PROCEDURE — 94664 DEMO&/EVAL PT USE INHALER: CPT

## 2023-11-13 PROCEDURE — 2580000003 HC RX 258: Performed by: FAMILY MEDICINE

## 2023-11-13 PROCEDURE — 87040 BLOOD CULTURE FOR BACTERIA: CPT

## 2023-11-13 PROCEDURE — 84145 PROCALCITONIN (PCT): CPT

## 2023-11-13 PROCEDURE — 82728 ASSAY OF FERRITIN: CPT

## 2023-11-13 PROCEDURE — 71275 CT ANGIOGRAPHY CHEST: CPT

## 2023-11-13 PROCEDURE — 87899 AGENT NOS ASSAY W/OPTIC: CPT

## 2023-11-13 PROCEDURE — 6370000000 HC RX 637 (ALT 250 FOR IP): Performed by: FAMILY MEDICINE

## 2023-11-13 PROCEDURE — 6360000004 HC RX CONTRAST MEDICATION: Performed by: RADIOLOGY

## 2023-11-13 PROCEDURE — XW0DXM6 INTRODUCTION OF BARICITINIB INTO MOUTH AND PHARYNX, EXTERNAL APPROACH, NEW TECHNOLOGY GROUP 6: ICD-10-PCS | Performed by: FAMILY MEDICINE

## 2023-11-13 PROCEDURE — 84484 ASSAY OF TROPONIN QUANT: CPT

## 2023-11-13 RX ORDER — ENOXAPARIN SODIUM 100 MG/ML
40 INJECTION SUBCUTANEOUS DAILY
Status: DISCONTINUED | OUTPATIENT
Start: 2023-11-13 | End: 2023-11-13

## 2023-11-13 RX ORDER — SODIUM CHLORIDE 0.9 % (FLUSH) 0.9 %
5-40 SYRINGE (ML) INJECTION EVERY 12 HOURS SCHEDULED
Status: DISCONTINUED | OUTPATIENT
Start: 2023-11-13 | End: 2023-11-17 | Stop reason: HOSPADM

## 2023-11-13 RX ORDER — DEXAMETHASONE SODIUM PHOSPHATE 10 MG/ML
8 INJECTION INTRAMUSCULAR; INTRAVENOUS ONCE
Status: COMPLETED | OUTPATIENT
Start: 2023-11-13 | End: 2023-11-13

## 2023-11-13 RX ORDER — ACETAMINOPHEN 325 MG/1
650 TABLET ORAL EVERY 6 HOURS PRN
Status: DISCONTINUED | OUTPATIENT
Start: 2023-11-13 | End: 2023-11-17 | Stop reason: HOSPADM

## 2023-11-13 RX ORDER — ONDANSETRON 2 MG/ML
4 INJECTION INTRAMUSCULAR; INTRAVENOUS EVERY 6 HOURS PRN
Status: DISCONTINUED | OUTPATIENT
Start: 2023-11-13 | End: 2023-11-17 | Stop reason: HOSPADM

## 2023-11-13 RX ORDER — ACETAMINOPHEN 650 MG/1
650 SUPPOSITORY RECTAL EVERY 6 HOURS PRN
Status: DISCONTINUED | OUTPATIENT
Start: 2023-11-13 | End: 2023-11-17 | Stop reason: HOSPADM

## 2023-11-13 RX ORDER — WARFARIN SODIUM 7.5 MG/1
7.5 TABLET ORAL DAILY
Status: DISCONTINUED | OUTPATIENT
Start: 2023-11-13 | End: 2023-11-14

## 2023-11-13 RX ORDER — SODIUM CHLORIDE 9 MG/ML
INJECTION, SOLUTION INTRAVENOUS CONTINUOUS
Status: DISCONTINUED | OUTPATIENT
Start: 2023-11-13 | End: 2023-11-15

## 2023-11-13 RX ORDER — SODIUM CHLORIDE 9 MG/ML
INJECTION, SOLUTION INTRAVENOUS PRN
Status: DISCONTINUED | OUTPATIENT
Start: 2023-11-13 | End: 2023-11-17 | Stop reason: HOSPADM

## 2023-11-13 RX ORDER — SIMVASTATIN 40 MG
40 TABLET ORAL NIGHTLY
Status: DISCONTINUED | OUTPATIENT
Start: 2023-11-13 | End: 2023-11-13 | Stop reason: CLARIF

## 2023-11-13 RX ORDER — SODIUM CHLORIDE 0.9 % (FLUSH) 0.9 %
5-40 SYRINGE (ML) INJECTION PRN
Status: DISCONTINUED | OUTPATIENT
Start: 2023-11-13 | End: 2023-11-17 | Stop reason: HOSPADM

## 2023-11-13 RX ORDER — POLYETHYLENE GLYCOL 3350 17 G/17G
17 POWDER, FOR SOLUTION ORAL DAILY PRN
Status: DISCONTINUED | OUTPATIENT
Start: 2023-11-13 | End: 2023-11-17 | Stop reason: HOSPADM

## 2023-11-13 RX ORDER — LISINOPRIL 5 MG/1
5 TABLET ORAL NIGHTLY
Status: DISCONTINUED | OUTPATIENT
Start: 2023-11-13 | End: 2023-11-17 | Stop reason: HOSPADM

## 2023-11-13 RX ORDER — DEXAMETHASONE SODIUM PHOSPHATE 10 MG/ML
6 INJECTION, SOLUTION INTRAMUSCULAR; INTRAVENOUS EVERY 24 HOURS
Status: DISCONTINUED | OUTPATIENT
Start: 2023-11-13 | End: 2023-11-15

## 2023-11-13 RX ORDER — ATORVASTATIN CALCIUM 20 MG/1
20 TABLET, FILM COATED ORAL NIGHTLY
Status: DISCONTINUED | OUTPATIENT
Start: 2023-11-13 | End: 2023-11-17 | Stop reason: HOSPADM

## 2023-11-13 RX ORDER — ONDANSETRON 4 MG/1
4 TABLET, ORALLY DISINTEGRATING ORAL EVERY 8 HOURS PRN
Status: DISCONTINUED | OUTPATIENT
Start: 2023-11-13 | End: 2023-11-17 | Stop reason: HOSPADM

## 2023-11-13 RX ORDER — IPRATROPIUM BROMIDE AND ALBUTEROL SULFATE 2.5; .5 MG/3ML; MG/3ML
1 SOLUTION RESPIRATORY (INHALATION) EVERY 4 HOURS PRN
Status: DISCONTINUED | OUTPATIENT
Start: 2023-11-13 | End: 2023-11-17 | Stop reason: HOSPADM

## 2023-11-13 RX ADMIN — LISINOPRIL 5 MG: 5 TABLET ORAL at 20:32

## 2023-11-13 RX ADMIN — DEXAMETHASONE SODIUM PHOSPHATE 8 MG: 10 INJECTION INTRAMUSCULAR; INTRAVENOUS at 04:29

## 2023-11-13 RX ADMIN — SODIUM CHLORIDE, PRESERVATIVE FREE 10 ML: 5 INJECTION INTRAVENOUS at 09:13

## 2023-11-13 RX ADMIN — ATORVASTATIN CALCIUM 20 MG: 20 TABLET, FILM COATED ORAL at 20:32

## 2023-11-13 RX ADMIN — IPRATROPIUM BROMIDE AND ALBUTEROL SULFATE 1 DOSE: 2.5; .5 SOLUTION RESPIRATORY (INHALATION) at 08:05

## 2023-11-13 RX ADMIN — IOPAMIDOL 75 ML: 755 INJECTION, SOLUTION INTRAVENOUS at 02:51

## 2023-11-13 RX ADMIN — WARFARIN SODIUM 7.5 MG: 7.5 TABLET ORAL at 09:14

## 2023-11-13 RX ADMIN — WATER 1000 MG: 1 INJECTION INTRAMUSCULAR; INTRAVENOUS; SUBCUTANEOUS at 13:31

## 2023-11-13 RX ADMIN — WATER 1000 MG: 1 INJECTION INTRAMUSCULAR; INTRAVENOUS; SUBCUTANEOUS at 00:49

## 2023-11-13 RX ADMIN — AZITHROMYCIN MONOHYDRATE 500 MG: 500 INJECTION, POWDER, LYOPHILIZED, FOR SOLUTION INTRAVENOUS at 09:10

## 2023-11-13 RX ADMIN — DOXYCYCLINE 100 MG: 100 INJECTION, POWDER, LYOPHILIZED, FOR SOLUTION INTRAVENOUS at 00:48

## 2023-11-13 RX ADMIN — BARICITINIB 4 MG: 2 TABLET, FILM COATED ORAL at 09:14

## 2023-11-13 RX ADMIN — SODIUM CHLORIDE: 9 INJECTION, SOLUTION INTRAVENOUS at 20:38

## 2023-11-13 RX ADMIN — SODIUM CHLORIDE, PRESERVATIVE FREE 10 ML: 5 INJECTION INTRAVENOUS at 20:32

## 2023-11-13 RX ADMIN — DEXAMETHASONE SODIUM PHOSPHATE 6 MG: 10 INJECTION, SOLUTION INTRAMUSCULAR; INTRAVENOUS at 09:14

## 2023-11-13 RX ADMIN — SODIUM CHLORIDE: 9 INJECTION, SOLUTION INTRAVENOUS at 09:09

## 2023-11-13 ASSESSMENT — ENCOUNTER SYMPTOMS
COUGH: 1
SHORTNESS OF BREATH: 1

## 2023-11-13 NOTE — H&P
HISTORY AND PHYSICAL             Date: 11/13/2023        Patient Name: Howie Zhu     YOB: 1940      Age:  82 y.o. Chief Complaint     Chief Complaint   Patient presents with    Shortness of Breath     Started a couple days ago. Cough, congestion          History Obtained From   patient    History of Present Illness   82-year-old male with history of CVA, COPD, hypertension presents ED with 2 days of cough congestion drainage shortness of breath. Patient was found to be hypoxic in the low 80s. Chest x-ray and CT showed pneumonia no PE. Patient tested positive for COVID patient responded well to IV steroids and oxygen. Patient will be admitted for acute hypoxic respiratory failure, COVID-pneumonia, COPD exacerbation. Past Medical History     Past Medical History:   Diagnosis Date    Cerebral artery occlusion with cerebral infarction ([address])     left side weaker than right    [place] (hard of hearing)     Hx of blood clots     Hyperlipidemia     Hypertension         Past Surgical History     Past Surgical History:   Procedure Laterality Date    FRACTURE SURGERY      hand left    HERNIA REPAIR      HERNIA REPAIR Left 1/29/2020    LAPAROSCOPIC LEFT INGUINAL HERNIA REPAIR WITH MESH performed by Shikha Gunderson MD at [facility]          Medications Prior to Admission     Prior to Admission medications    Medication Sig Start Date End Date Taking? Authorizing Provider   ibuprofen (ADVIL;MOTRIN) 800 MG tablet Take 1 tablet by mouth every 6 hours as needed for Pain 1/29/20   Shikha Gunderson MD   warfarin (COUMADIN) 7.5 MG tablet Take 7.5 mg by mouth daily     Carlos Gao MD   lisinopril (PRINIVIL;ZESTRIL) 5 MG tablet Take 5 mg by mouth nightly     Carlos Gao MD   Simvastatin (ZOCOR PO) Take 40 mg by mouth nightly     Carlos Gao MD        Allergies   Patient has no known allergies.     Social History     Social History       Tobacco History       Smoking Right lower lobe airspace opacity consistent with pneumonia. Assessment      Hospital Problems             Last Modified POA    * (Principal) Acute respiratory failure with hypoxia ([address]) 11/13/2023 Yes     COVID-19 positive test (U07.1, COVID-19) with Acute Pneumonia (J12.89, Other viral pneumonia)  (If respiratory failure or sepsis present, add as separate assessment)    COPD exacerbation    Plan   Admit to [facility]  Oxygen therapy  ID consult  Start barcitinib   IV dexamethasone  IV Zithromax  SCDs  Resume Home medical conditions  9.   Inflammatory markers  Consultations Ordered:  IP CONSULT TO INTERNAL MEDICINE    Electronically signed by Blanquita Sharma DO on 11/13/23 at 7:16 AM EST

## 2023-11-13 NOTE — CONSULTS
Consult Note            Date:11/13/2023        Patient Name:Howie Costa     Date of Birth:[birth year]     Age:83 y.o. Inpatient consult to Infectious Diseases  Consult performed by: Gonzalez Belle MD  Consult ordered by: Kranthi Reddy DO          Chief Complaint     Chief Complaint   Patient presents with    Shortness of Breath     Started a couple days ago. Cough, congestion          History Obtained From   patient, electronic medical record    History of Present Illness   Howie Barnes is a 82 y.o. male who  has a past medical history of Cerebral artery occlusion with cerebral infarction ([address]), [place] (hard of hearing), Hx of blood clots, Hyperlipidemia, and Hypertension. Pt is [place]   Pt is a poor historian   Pt has a telesitter   Pt from home lives with wife   Came in with cough/sob  He states for about a month      Afebrile 2L   Wbc6.3 cr1.1   Was hypoxic currently on o2   Admitted for covid strated on steroids and natalia     CTA PULMONARY W CONTRAST    Result Date: 11/13/2023  1. Evaluation is significantly limited due to marked respiratory motion artifact. There is no obvious acute pulmonary embolism, however evaluation is significantly limited beyond the central pulmonary arterial system particularly on the right. Could consider repeat examination. At minimum would advise short-term clinical follow-up. 2. Consolidation and tree-in-bud opacification in the right lower lobe and to a lesser degree the right middle lobe. Findings concerning for pneumonia and endobronchial spread of infection. Would recommend short-term follow-up imaging to ensure resolution. 3. Prominent right hilar and subcarinal nodes are nonspecific in nature. XR CHEST PORTABLE    Result Date: 11/13/2023  Right lower lobe airspace opacity consistent with pneumonia.        Past Medical History     Past Medical History:   Diagnosis Date    Cerebral artery occlusion with cerebral infarction ([address])     left side weaker than right    [facility] GLUCOSE 130*   MG 1.8       Recent Labs     11/12/23 2330 11/12/23 2335 11/13/23 0015 11/13/23 0817   CRP  --   --   --  204.0*   PROCAL  --   --  0.27*  --    FERRITIN  --   --   --  800   DDIMER 1,327*  --   --   --    INR 2.2  --   --   --    PROTIME 25.5*  --   --   --    AST  --  62*  --   --    ALT  --  35  --   --      Lab Results   Component Value Date/Time    HDL 81 09/20/2017 10:57 AM    LDLCALC 100 09/20/2017 10:57 AM    LABVLDL 20 09/20/2017 10:57 AM     No results found for: \"VITD25\"       Recent Labs     11/12/23 2330   PROTIME 25.5*   INR 2.2      No results for input(s): \"APTT\" in the last 72 hours. Recent Labs     11/12/23 2335   AST 62*   ALT 35   BILITOT 0.5   ALKPHOS 29*     Results       Procedure Component Value Units Date/Time    Blood Culture 1 [[id number]] Collected: 11/13/23 0015    Order Status: Completed Specimen: Blood Updated: 11/13/23 0956     Specimen Description . BLOOD     Special Requests          Culture NO GROWTH <24 HRS    Culture, Blood 2 [[id number]] Collected: 11/13/23 0015    Order Status: Completed Specimen: Blood Updated: 11/13/23 0957     Specimen Description . BLOOD     Special Requests          Culture NO GROWTH <24 HRS    COVID-19, Rapid [[id number]]  (Abnormal) Collected: 11/12/23 2335    Order Status: Completed Specimen: Nasopharyngeal Swab Updated: 11/13/23 0015     Specimen Description . NASOPHARYNGEAL SWAB     SARS-CoV-2, Rapid DETECTED     Comment:       Rapid NATT:  Negative results should be treated as presumptive and, if inconsistent with clinical signs   and symptoms or necessary for patient management,  should be tested with an alternative molecular assay. Negative results do not preclude   SARS-CoV-2 infection and   should not be used as the sole basis for patient management decisions. This test has been authorized by the FDA under an Emergency Use Authorization (EUA) for use   by authorized laboratories.         Fact sheet for Healthcare Providers:

## 2023-11-13 NOTE — CARE COORDINATION
11/13/2023 5725 CM attempted to speak with pt for transition of care needs at d/c. Pt unavailable, CM will do full assessment when pt is available.   Electronically signed by Viridiana Gutiérrez RN on 11/13/2023 at 4:02 PM

## 2023-11-13 NOTE — ACP (ADVANCE CARE PLANNING)
Advance Care Planning   Healthcare Decision Maker:    Primary Decision Maker: Nicol Costa - [facility] - [phone]

## 2023-11-13 NOTE — ED NOTES
Pt's resting O2 sat was 96% on room air. Pt ambulated to restroom on room air and O2 sat slowly decreased down to 87%. Pt assisted back to room and into bed and O2 sat remained 87% on room air. O2 applied at 2L via n/c and O2 sat was 90%-91%. O2 increased to 3L and O2 sat increased to 93%. Dr. Bowling Party made aware.      Anish Mcdonald RN  11/13/23 0092

## 2023-11-13 NOTE — PROGRESS NOTES
Spouse called and stated she will not be able to come up due to she also has gotten Covid. She also stated patient receives Coumadin 6 days a week and does not take it on Fridays.

## 2023-11-13 NOTE — ED NOTES
Pt incontinent of urine. Bed linens changed and wet clothes removed. Pt changed into a clean gown and given warm blankets.      Ketty Villarreal RN  11/13/23 0104

## 2023-11-13 NOTE — ED PROVIDER NOTES
mg IntraVENous Stopped 11/13/23 1055)   ipratropium 0.5 mg-albuterol 2.5 mg (DUONEB) nebulizer solution 1 Dose (1 Dose Inhalation Given 11/13/23 0805)   lisinopril (PRINIVIL;ZESTRIL) tablet 5 mg (5 mg Oral Given 11/13/23 2032)   warfarin (COUMADIN) tablet 7.5 mg (7.5 mg Oral Given 11/13/23 0914)   atorvastatin (LIPITOR) tablet 20 mg (20 mg Oral Given 11/13/23 2032)   cefTRIAXone (ROCEPHIN) 1,000 mg in sterile water 10 mL IV syringe (1,000 mg IntraVENous Given 11/13/23 0049)   doxycycline (VIBRAMYCIN) 100 mg in sodium chloride 0.9 % 100 mL IVPB ([id number]) (0 mg IntraVENous Stopped 11/13/23 0418)   iopamidol (ISOVUE-370) 76 % injection 75 mL (75 mLs IntraVENous Given 11/13/23 0251)   dexamethasone (DECADRON) injection 8 mg (8 mg IntraVENous Given 11/13/23 0429)         Is this patient to be included in the SEP-1 core measure due to severe sepsis or septic shock?  No   Exclusion criteria - the patient is NOT to be included for SEP-1 Core Measure due to:  Viral etiology found or highly suspected (including COVID-19) without concomitant bacterial infection        Medical Decision Making/Differential Diagnosis:    CC/HPI Summary, Social Determinants of health, Records Reviewed, DDx, testing done/not done, ED Course, Reassessment, disposition considerations/shared decision making with patient, consults, disposition:      ED Course as of 11/14/23 0050 Mon Nov 13, 2023   0358 87% on RA [SS]      ED Course User Index  [SS] Kali Walls MD        Chronic Conditions:   Past Medical History:   Diagnosis Date    Cerebral artery occlusion with cerebral infarction ([address])     left side weaker than right    [place] (hard of hearing)     Hx of blood clots     Hyperlipidemia     Hypertension        CONSULTS: (Who and What was discussed)  IP CONSULT TO INTERNAL MEDICINE  IP CONSULT TO INFECTIOUS DISEASES  IP CONSULT TO PHARMACY    Discussion with Other Profesionals : Admitting Team Plan to discuss with Dr. Lillie Palmer for admission pending workup completion, please see ED course for remainder of work-up details. FINAL IMPRESSION      1. COVID    2. Hypoxia    3. Pneumonia due to infectious organism, unspecified laterality, unspecified part of lung          DISPOSITION/PLAN     DISPOSITION Admitted 11/13/2023 05:57:23 AM    PATIENT REFERRED TO:  No follow-up provider specified.     DISCHARGE MEDICATIONS:  Current Discharge Medication List          DISCONTINUED MEDICATIONS:  Current Discharge Medication List               (Please note that portions of this note were completed with a voice recognition program.  Efforts were made to edit the dictations but occasionally words are mis-transcribed.)    Darrell Castaneda MD (electronically signed)     Darrell Castaneda MD  Resident  11/14/23 4501

## 2023-11-13 NOTE — PROGRESS NOTES
Pharmacy Consultation Note    Consult date: 11/13/2023  Physician/provider: Dr. Briseida Sam has been consulted to evaluate criteria for Baricitinib therapy. Based on the algorithm, the patient DOES currently meet [employer] approved Covid-19 treatment criteria for Baricitinib. Noted history of blood clots and stroke. Patient is on warfarin with therapeutic INR which would reduce his risk. Would discontinue baricitinib as soon as appropriate clinical improvement is noted due to VTE risk.       Thank you for the consult,  Julio César Alvarez, [facility]

## 2023-11-14 LAB
ALBUMIN SERPL-MCNC: 3.4 G/DL (ref 3.5–5.2)
ALP SERPL-CCNC: 37 U/L (ref 40–129)
ALT SERPL-CCNC: 45 U/L (ref 0–40)
ANION GAP SERPL CALCULATED.3IONS-SCNC: 8 MMOL/L (ref 7–16)
AST SERPL-CCNC: 56 U/L (ref 0–39)
BASOPHILS # BLD: 0.01 K/UL (ref 0–0.2)
BASOPHILS NFR BLD: 0 % (ref 0–2)
BILIRUB SERPL-MCNC: 0.3 MG/DL (ref 0–1.2)
BUN SERPL-MCNC: 22 MG/DL (ref 6–23)
CALCIUM SERPL-MCNC: 8.5 MG/DL (ref 8.6–10.2)
CHLORIDE SERPL-SCNC: 105 MMOL/L (ref 98–107)
CO2 SERPL-SCNC: 21 MMOL/L (ref 22–29)
CREAT SERPL-MCNC: 0.8 MG/DL (ref 0.7–1.2)
CRP SERPL HS-MCNC: 141 MG/L (ref 0–5)
EOSINOPHIL # BLD: 0 K/UL (ref 0.05–0.5)
EOSINOPHILS RELATIVE PERCENT: 0 % (ref 0–6)
ERYTHROCYTE [DISTWIDTH] IN BLOOD BY AUTOMATED COUNT: 14.1 % (ref 11.5–15)
FERRITIN SERPL-MCNC: 1000 NG/ML
GFR SERPL CREATININE-BSD FRML MDRD: >60 ML/MIN/1.73M2
GLUCOSE SERPL-MCNC: 154 MG/DL (ref 74–99)
HCT VFR BLD AUTO: 31.8 % (ref 37–54)
HGB BLD-MCNC: 10.7 G/DL (ref 12.5–16.5)
IMM GRANULOCYTES # BLD AUTO: 0.09 K/UL (ref 0–0.58)
IMM GRANULOCYTES NFR BLD: 1 % (ref 0–5)
INR PPP: 4.5
L PNEUMO1 AG UR QL IA.RAPID: NEGATIVE
LYMPHOCYTES NFR BLD: 0.68 K/UL (ref 1.5–4)
LYMPHOCYTES RELATIVE PERCENT: 8 % (ref 20–42)
MCH RBC QN AUTO: 30.2 PG (ref 26–35)
MCHC RBC AUTO-ENTMCNC: 33.6 G/DL (ref 32–34.5)
MCV RBC AUTO: 89.8 FL (ref 80–99.9)
MONOCYTES NFR BLD: 0.44 K/UL (ref 0.1–0.95)
MONOCYTES NFR BLD: 5 % (ref 2–12)
NEUTROPHILS NFR BLD: 86 % (ref 43–80)
NEUTS SEG NFR BLD: 7.18 K/UL (ref 1.8–7.3)
PLATELET # BLD AUTO: 144 K/UL (ref 130–450)
PMV BLD AUTO: 11.3 FL (ref 7–12)
POTASSIUM SERPL-SCNC: 4.2 MMOL/L (ref 3.5–5)
PROT SERPL-MCNC: 6.6 G/DL (ref 6.4–8.3)
PROTHROMBIN TIME: 52.8 SEC (ref 9.3–12.4)
RBC # BLD AUTO: 3.54 M/UL (ref 3.8–5.8)
S PNEUM AG SPEC QL: NEGATIVE
SODIUM SERPL-SCNC: 134 MMOL/L (ref 132–146)
SPECIMEN SOURCE: NORMAL
WBC OTHER # BLD: 8.4 K/UL (ref 4.5–11.5)

## 2023-11-14 PROCEDURE — 6360000002 HC RX W HCPCS: Performed by: FAMILY MEDICINE

## 2023-11-14 PROCEDURE — 2060000000 HC ICU INTERMEDIATE R&B

## 2023-11-14 PROCEDURE — 97530 THERAPEUTIC ACTIVITIES: CPT | Performed by: PHYSICAL THERAPIST

## 2023-11-14 PROCEDURE — 36415 COLL VENOUS BLD VENIPUNCTURE: CPT

## 2023-11-14 PROCEDURE — 6370000000 HC RX 637 (ALT 250 FOR IP): Performed by: FAMILY MEDICINE

## 2023-11-14 PROCEDURE — 85610 PROTHROMBIN TIME: CPT

## 2023-11-14 PROCEDURE — 80053 COMPREHEN METABOLIC PANEL: CPT

## 2023-11-14 PROCEDURE — 82728 ASSAY OF FERRITIN: CPT

## 2023-11-14 PROCEDURE — 97116 GAIT TRAINING THERAPY: CPT | Performed by: PHYSICAL THERAPIST

## 2023-11-14 PROCEDURE — 97161 PT EVAL LOW COMPLEX 20 MIN: CPT | Performed by: PHYSICAL THERAPIST

## 2023-11-14 PROCEDURE — 1200000000 HC SEMI PRIVATE

## 2023-11-14 PROCEDURE — 2580000003 HC RX 258: Performed by: FAMILY MEDICINE

## 2023-11-14 PROCEDURE — 85025 COMPLETE CBC W/AUTO DIFF WBC: CPT

## 2023-11-14 PROCEDURE — 2700000000 HC OXYGEN THERAPY PER DAY

## 2023-11-14 PROCEDURE — 86140 C-REACTIVE PROTEIN: CPT

## 2023-11-14 RX ORDER — WARFARIN SODIUM 6 MG/1
6 TABLET ORAL DAILY
Status: DISCONTINUED | OUTPATIENT
Start: 2023-11-15 | End: 2023-11-15

## 2023-11-14 RX ORDER — GUAIFENESIN 600 MG/1
600 TABLET, EXTENDED RELEASE ORAL 2 TIMES DAILY
Status: DISCONTINUED | OUTPATIENT
Start: 2023-11-14 | End: 2023-11-17 | Stop reason: HOSPADM

## 2023-11-14 RX ADMIN — SODIUM CHLORIDE, PRESERVATIVE FREE 10 ML: 5 INJECTION INTRAVENOUS at 08:48

## 2023-11-14 RX ADMIN — DEXAMETHASONE SODIUM PHOSPHATE 6 MG: 10 INJECTION, SOLUTION INTRAMUSCULAR; INTRAVENOUS at 06:23

## 2023-11-14 RX ADMIN — ATORVASTATIN CALCIUM 20 MG: 20 TABLET, FILM COATED ORAL at 19:54

## 2023-11-14 RX ADMIN — SODIUM CHLORIDE: 9 INJECTION, SOLUTION INTRAVENOUS at 19:54

## 2023-11-14 RX ADMIN — GUAIFENESIN 600 MG: 600 TABLET, EXTENDED RELEASE ORAL at 19:54

## 2023-11-14 RX ADMIN — WATER 1000 MG: 1 INJECTION INTRAMUSCULAR; INTRAVENOUS; SUBCUTANEOUS at 13:30

## 2023-11-14 RX ADMIN — AZITHROMYCIN MONOHYDRATE 500 MG: 500 INJECTION, POWDER, LYOPHILIZED, FOR SOLUTION INTRAVENOUS at 06:23

## 2023-11-14 RX ADMIN — WARFARIN SODIUM 7.5 MG: 7.5 TABLET ORAL at 08:48

## 2023-11-14 RX ADMIN — LISINOPRIL 5 MG: 5 TABLET ORAL at 19:54

## 2023-11-14 RX ADMIN — SODIUM CHLORIDE, PRESERVATIVE FREE 10 ML: 5 INJECTION INTRAVENOUS at 19:54

## 2023-11-14 RX ADMIN — BARICITINIB 4 MG: 2 TABLET, FILM COATED ORAL at 08:48

## 2023-11-14 RX ADMIN — SODIUM CHLORIDE: 9 INJECTION, SOLUTION INTRAVENOUS at 08:53

## 2023-11-14 ASSESSMENT — PAIN SCALES - GENERAL
PAINLEVEL_OUTOF10: 0

## 2023-11-14 ASSESSMENT — ENCOUNTER SYMPTOMS
SHORTNESS OF BREATH: 1
NAUSEA: 0
VOMITING: 0

## 2023-11-14 NOTE — PLAN OF CARE
Problem: Discharge Planning  Goal: Discharge to home or other facility with appropriate resources  11/13/2023 2046 by Ford Rojas RN  Outcome: Progressing     Problem: Safety - Adult  Goal: Free from fall injury  11/13/2023 2046 by Ford Rojas RN  Outcome: Progressing

## 2023-11-14 NOTE — PROGRESS NOTES
Physical Therapy    Physical Therapy Initial Evaluation/Plan of Care    Room #:  0071/5017-65  Patient Name: Alma Souza  YOB: 1940  MRN: [id number]    Date of Service: 11/14/2023     Tentative placement recommendation: Subacute unless patient meets goals then Home Health Physical Therapy  Equipment recommendation: Patient has needed equipment       Evaluating Physical Therapist: Alysha Beard, PT  #[id number]      Specific Provider Orders/Date/Referring Provider :     PT eval and treat  Start:  11/14/23 1315,   End:  11/14/23 1315,   ONE TIME,   Standing Count:  1 Occurrences,   R         Lisa Aguilar Adjutant, DO        Admitting Natalia. a gnosis:   Hypoxia [R09.02]  Acute respiratory failure with hypoxia (HCC) [J96.01]  Pneumonia due to infectious organism, unspecified laterality, unspecified part of lung [J18.9]  COVID [U07.1]  Pneumonia due to COVID-19 virus [U07.1, J12.82]     Admitted with    above , shortness of breath   Surgery: none  Visit Diagnoses         Codes    COVID    -  Primary U07.1    Hypoxia     R09.02    Pneumonia due to infectious organism, unspecified laterality, unspecified part of lung     J18.9            Patient Active Problem List   Diagnosis    Recurrent left inguinal hernia    Acute respiratory failure with hypoxia (HCC)    Pneumonia due to COVID-19 virus        ASSESSMENT of Current Deficits Patient exhibits decreased strength, balance, and endurance impairing functional mobility, transfers, gait , gait distance, and tolerance to activity are barriers to d/c and require skilled intervention to address concerns listed above to increase safety and independence at discharge. Decreased strength, balance and endurance  increases patient's risk for fall.    Patient is mildly unsteady with gait using iv pole and .jamal  during session with   loss of balance , no  dizziness, however  shortness of breath  noted Patient with difficulty oxygenating impairing endurance and functional independence requiring 3 Liters of o2 via nasal cannula to maintain > 90% po2. And cuing to maintain breath flow during activity Pt requires skilled monitoring of patient's response and vital signs during treatment session. hard of hearing        PHYSICAL THERAPY  PLAN OF CARE       Physical therapy plan of care is established based on physician order,  patient diagnosis and clinical assessment    Current Treatment Recommendations:    -Bed Mobility: Lower extremity exercises , Upper extremity exercises , and Trunk control activities   -Standing Balance: Perform strengthening exercises in standing to promote motor control with or without upper extremity support  and Challenge balance utilizing reaching  activities beyond center of gravity    -Transfers: Provide instruction on proper hand and foot position for adequate transfer of weight onto lower extremities and use of gait device if needed and Cues for hand placement, technique and safety. Provide stabilization to prevent fall   -Gait: Gait training, Standing activities to improve: base of support, weight shift, weight bearing , Exercises to improve trunk control, and Exercises to improve hip and knee control   -Endurance: Utilize Supervised activities to increase level of endurance to allow for safe functional mobility including transfers and gait  and Use graduated activities to promote good breathing techniques and provide support and education to maximize respiratory function  -Stairs: Stair training with instruction on proper technique and hand placement on rail    PT long term treatment goals are located in below grid    Patient and or family understand(s) diagnosis, prognosis, and plan of care. Frequency of treatments: Patient will be seen  daily.          Prior Level of Function: Patient ambulated independently    Rehab Potential: good   for baseline    Past medical history:   Past Medical History:   Diagnosis Date    Cerebral artery occlusion with

## 2023-11-14 NOTE — PLAN OF CARE
Problem: Discharge Planning  Goal: Discharge to home or other facility with appropriate resources  Outcome: Progressing  Flowsheets  Taken 11/13/2023 1033  Discharge to home or other facility with appropriate resources: Identify discharge learning needs (meds, wound care, etc)  Taken 11/13/2023 0917  Discharge to home or other facility with appropriate resources: Arrange for needed discharge resources and transportation as appropriate     Problem: Safety - Adult  Goal: Free from fall injury  Outcome: Progressing

## 2023-11-14 NOTE — CARE COORDINATION
Case Management Assessment  Initial Evaluation    Date/Time of Evaluation: 11/14/2023 3:19 PM  Assessment Completed by: SONAL Cho    If patient is discharged prior to next notation, then this note serves as note for discharge by case management. Patient Name: Howie Lazo                   YOB: 1940  Diagnosis: Hypoxia [R09.02]  Acute respiratory failure with hypoxia ([address]) [J96.01]  Pneumonia due to infectious organism, unspecified laterality, unspecified part of lung [J18.9]  COVID [U07.1]  Pneumonia due to COVID-19 virus [U07.1, J12.82]                   Date / Time: 11/12/2023 10:49 PM    Patient Admission Status: Inpatient   Readmission Risk (Low < 19, Mod (19-27), High > 27): Readmission Risk Score: 11.7    Current PCP: Arianna Nieto, DO  PCP verified by CM? Yes    Chart Reviewed: Yes      History Provided by: Spouse  Patient Orientation: Alert and Oriented    Patient Cognition: Alert    Hospitalization in the last 30 days (Readmission):  No    If yes, Readmission Assessment in CM Navigator will be completed. Advance Directives:      Code Status: Full Code   Patient's Primary Decision Maker is: Legal Next of Kin    Primary Decision Maker: Nicol Costa - Spouse - [phone]    Discharge Planning:    Patient lives with: Spouse/Significant Other Type of Home: House  Primary Care Giver: Spouse  Patient Support Systems include: Spouse/Significant Other   Current Financial resources:    Current community resources:    Current services prior to admission: None            Current DME:              Type of Home Care services:  None    ADLS  Prior functional level: Assistance with the following:, Housework, Cooking, Shopping  Current functional level: Housework, Cooking, Assistance with the following:, Shopping    PT AM-PAC: 12 /24  OT AM-PAC:   /24    Family can provide assistance at DC:  Yes  Would you like Case Management to discuss the discharge plan with any other family

## 2023-11-14 NOTE — ACP (ADVANCE CARE PLANNING)
Advance Care Planning     Advance Care Planning Activator (Inpatient)  Conversation Note      Date of ACP Conversation: 11/14/2023     Conversation Conducted with: Patient with Decision Making Capacity   Healthcare Decision Maker: Next of Kin by law (only applies in absence of above) (name) Sedrick Costa     ACP Activator: SONAL Brown    {Health Care Decision Maker:     Current Designated Health Care Decision Maker:     Primary Decision Maker: Nicol Costa - Spouse - [phone]  Click here to complete [address] including section of the Healthcare Decision Maker Relationship (ie \"Primary\")  Today we documented Decision Maker(s) consistent with Legal Next of Kin hierarchy. Care Preferences    Ventilation: \"If you were in your present state of health and suddenly became very ill and were unable to breathe on your own, what would your preference be about the use of a ventilator (breathing machine) if it were available to you? \"  no     Would the patient desire the use of ventilator (breathing machine)?:     \"If your health worsens and it becomes clear that your chance of recovery is unlikely, what would your preference be about the use of a ventilator (breathing machine) if it were available to you? \"     Would the patient desire the use of ventilator (breathing machine)?: No      Resuscitation  \"CPR works best to restart the heart when there is a sudden event, like a heart attack, in someone who is otherwise healthy. Unfortunately, CPR does not typically restart the heart for people who have serious health conditions or who are very sick. \"    \"In the event your heart stopped as a result of an underlying serious health condition, would you want attempts to be made to restart your heart (answer \"yes\" for attempt to resuscitate) or would you prefer a natural death (answer \"no\" for do not attempt to resuscitate)? \" no       [] Yes   [] No   Educated Patient / Decision Maker regarding differences between Advance Directives and portable DNR orders.     Length of ACP Conversation in minutes:      Conversation Outcomes:  ACP discussion completed    Follow-up plan:    [] Schedule follow-up conversation to continue planning  [] Referred individual to Provider for additional questions/concerns   [] Advised patient/agent/surrogate to review completed ACP document and update if needed with changes in condition, patient preferences or care setting    [] This note routed to one or more involved healthcare providers

## 2023-11-15 LAB
BILIRUB UR QL STRIP: NEGATIVE
CLARITY UR: CLEAR
COLOR UR: YELLOW
COMMENT: NORMAL
CRP SERPL HS-MCNC: 69 MG/L (ref 0–5)
EKG ATRIAL RATE: 86 BPM
EKG P AXIS: 74 DEGREES
EKG P-R INTERVAL: 150 MS
EKG Q-T INTERVAL: 390 MS
EKG QRS DURATION: 68 MS
EKG QTC CALCULATION (BAZETT): 466 MS
EKG R AXIS: 47 DEGREES
EKG T AXIS: 68 DEGREES
EKG VENTRICULAR RATE: 86 BPM
FERRITIN SERPL-MCNC: 1249 NG/ML
GLUCOSE UR STRIP-MCNC: NEGATIVE MG/DL
HGB UR QL STRIP.AUTO: NEGATIVE
INR PPP: 8.4
KETONES UR STRIP-MCNC: NEGATIVE MG/DL
LEUKOCYTE ESTERASE UR QL STRIP: NEGATIVE
MICROORGANISM SPEC CULT: NORMAL
NITRITE UR QL STRIP: NEGATIVE
PH UR STRIP: 5.5 [PH] (ref 5–9)
PROT UR STRIP-MCNC: NEGATIVE MG/DL
PROTHROMBIN TIME: 99.1 SEC (ref 9.3–12.4)
SP GR UR STRIP: 1.02 (ref 1–1.03)
SPECIMEN DESCRIPTION: NORMAL
UROBILINOGEN UR STRIP-ACNC: 0.2 EU/DL (ref 0–1)

## 2023-11-15 PROCEDURE — 6370000000 HC RX 637 (ALT 250 FOR IP): Performed by: FAMILY MEDICINE

## 2023-11-15 PROCEDURE — 93010 ELECTROCARDIOGRAM REPORT: CPT | Performed by: INTERNAL MEDICINE

## 2023-11-15 PROCEDURE — 81003 URINALYSIS AUTO W/O SCOPE: CPT

## 2023-11-15 PROCEDURE — 97535 SELF CARE MNGMENT TRAINING: CPT

## 2023-11-15 PROCEDURE — 85610 PROTHROMBIN TIME: CPT

## 2023-11-15 PROCEDURE — 97110 THERAPEUTIC EXERCISES: CPT

## 2023-11-15 PROCEDURE — 97530 THERAPEUTIC ACTIVITIES: CPT

## 2023-11-15 PROCEDURE — 2700000000 HC OXYGEN THERAPY PER DAY

## 2023-11-15 PROCEDURE — 36415 COLL VENOUS BLD VENIPUNCTURE: CPT

## 2023-11-15 PROCEDURE — 82728 ASSAY OF FERRITIN: CPT

## 2023-11-15 PROCEDURE — 97165 OT EVAL LOW COMPLEX 30 MIN: CPT

## 2023-11-15 PROCEDURE — 1200000000 HC SEMI PRIVATE

## 2023-11-15 PROCEDURE — 6360000002 HC RX W HCPCS: Performed by: FAMILY MEDICINE

## 2023-11-15 PROCEDURE — 2580000003 HC RX 258: Performed by: FAMILY MEDICINE

## 2023-11-15 PROCEDURE — 86140 C-REACTIVE PROTEIN: CPT

## 2023-11-15 RX ORDER — CLOPIDOGREL BISULFATE 75 MG/1
75 TABLET ORAL DAILY
Status: DISCONTINUED | OUTPATIENT
Start: 2023-11-16 | End: 2023-11-17 | Stop reason: HOSPADM

## 2023-11-15 RX ORDER — NICOTINE 21 MG/24HR
1 PATCH, TRANSDERMAL 24 HOURS TRANSDERMAL DAILY
Status: DISCONTINUED | OUTPATIENT
Start: 2023-11-15 | End: 2023-11-17 | Stop reason: HOSPADM

## 2023-11-15 RX ORDER — DEXAMETHASONE SODIUM PHOSPHATE 10 MG/ML
10 INJECTION, SOLUTION INTRAMUSCULAR; INTRAVENOUS EVERY 24 HOURS
Status: DISCONTINUED | OUTPATIENT
Start: 2023-11-16 | End: 2023-11-17 | Stop reason: HOSPADM

## 2023-11-15 RX ADMIN — SODIUM CHLORIDE, PRESERVATIVE FREE 10 ML: 5 INJECTION INTRAVENOUS at 20:06

## 2023-11-15 RX ADMIN — ACETAMINOPHEN 650 MG: 325 TABLET ORAL at 22:02

## 2023-11-15 RX ADMIN — AZITHROMYCIN MONOHYDRATE 500 MG: 500 INJECTION, POWDER, LYOPHILIZED, FOR SOLUTION INTRAVENOUS at 05:46

## 2023-11-15 RX ADMIN — GUAIFENESIN 600 MG: 600 TABLET, EXTENDED RELEASE ORAL at 20:05

## 2023-11-15 RX ADMIN — ATORVASTATIN CALCIUM 20 MG: 20 TABLET, FILM COATED ORAL at 20:05

## 2023-11-15 RX ADMIN — BARICITINIB 4 MG: 2 TABLET, FILM COATED ORAL at 08:39

## 2023-11-15 RX ADMIN — LISINOPRIL 5 MG: 5 TABLET ORAL at 20:05

## 2023-11-15 RX ADMIN — GUAIFENESIN 600 MG: 600 TABLET, EXTENDED RELEASE ORAL at 08:39

## 2023-11-15 RX ADMIN — DEXAMETHASONE SODIUM PHOSPHATE 6 MG: 10 INJECTION, SOLUTION INTRAMUSCULAR; INTRAVENOUS at 05:44

## 2023-11-15 RX ADMIN — SODIUM CHLORIDE, PRESERVATIVE FREE 10 ML: 5 INJECTION INTRAVENOUS at 08:40

## 2023-11-15 RX ADMIN — SODIUM CHLORIDE, PRESERVATIVE FREE 10 ML: 5 INJECTION INTRAVENOUS at 14:06

## 2023-11-15 RX ADMIN — WATER 1000 MG: 1 INJECTION INTRAMUSCULAR; INTRAVENOUS; SUBCUTANEOUS at 14:06

## 2023-11-15 ASSESSMENT — ENCOUNTER SYMPTOMS
SHORTNESS OF BREATH: 1
VOMITING: 0
NAUSEA: 0

## 2023-11-15 ASSESSMENT — PAIN SCALES - GENERAL: PAINLEVEL_OUTOF10: 5

## 2023-11-15 ASSESSMENT — PAIN DESCRIPTION - ORIENTATION: ORIENTATION: RIGHT

## 2023-11-15 ASSESSMENT — PAIN DESCRIPTION - LOCATION: LOCATION: HIP

## 2023-11-15 ASSESSMENT — PAIN DESCRIPTION - DESCRIPTORS: DESCRIPTORS: ACHING

## 2023-11-15 NOTE — CARE COORDINATION
SOCIAL WORK / DISCHARGE PLANNING:   Pt continues on 3L O2, no home O2 at home, will need to watch for home O2 needs. Pt and wife plan for him to return home. He was independent PTA but wife can assist as needed. Will discuss if they are agreeable to consider HHC at [place]. Son will follow, assist with home going transport.            Electronically signed by SONAL Basilio on 11/15/2023 at 3:10 PM

## 2023-11-15 NOTE — PROGRESS NOTES
INR ? 3.5 Notification: Pharmacy Progress Note    Recent Labs     11/12/23  2330 11/14/23  0947 11/15/23  0721   INR 2.2 4.5 8.4*       INR Goal: 2 - 3 (ACCP recommended for VTE (DVT, PE, etc.) A fib, AMI, etc)    Warfarin Indication: DVT, PE    Your patient is currently taking the following drugs that may cause a drug interaction (annotated with X):      x Home Med? Meds Increasing INR     Allopurinol      Amiodarone/Propafenone/Dronedarone     Androgens     Chemotherapy (BBW: Capecitabine)     Ciprofloxacin/Levofloxacin     Clarithromycin/Erythromycin/Azithromycin     Fluconazole/Itraconazole/Voriconazole/Ketoconazole     Metronidazole     Phenytoin (Variable)   x   Statins/Fenofibrate/Gemfibrozil   x  Steroids (Variable/Dose Dependent)     Sulfamethoxazole/Trimethoprim     Tramadol      Other:       Comments regarding medication interactions: warfarin order modified with parameters to hold dose when INR greater than 3.       (Please be aware that most other anticoagulants can cause an increase in the INR.)      Mykel Payan [facility]  11/15/2023 8:44 AM

## 2023-11-15 NOTE — PROGRESS NOTES
[facility]  [address] [place]        Date:11/15/2023                                                  Patient Name: Claudette Sherman    MRN: [id number]    : 1940    Room: [address] [place] [address]      Evaluating OT: Magali Arshad OTR/L; #[id number]     Referring Provider and Specific Provider Orders/Date:      23  OT eval and treat  Start:  23,   End:  23,   ONE TIME,   Standing Count:  1 Occurrences,   R         Mejia Aguilar, DO      Placement Recommendation: Subacute Rehab vs home with occupational therapy if patient continues to progress with OT. Diagnosis:   1. COVID    2. Hypoxia    3.  Pneumonia due to infectious organism, unspecified laterality, unspecified part of lung         Surgery: None      Pertinent Medical History:       Past Medical History:   Diagnosis Date    Cerebral artery occlusion with cerebral infarction ([address])     left side weaker than right    [place] (hard of hearing)     Hx of blood clots     Hyperlipidemia     Hypertension          Past Surgical History:   Procedure Laterality Date    FRACTURE SURGERY      hand left    HERNIA REPAIR      HERNIA REPAIR Left 2020    LAPAROSCOPIC LEFT INGUINAL HERNIA REPAIR WITH MESH performed by Roderick Haile MD at [address]          Precautions:  Fall Risk, COVID-19 Droplet Plus, Up as Tolerated, [place]     Assessment of current deficits:     [x] Functional mobility  [x]ADLs  [x] Strength               []Cognition    [x] Functional transfers   [x] IADLs         [] Safety Awareness   [x]Endurance    [] Fine Coordination              [x] Balance      [] Vision/perception   []Sensation     []Gross Motor Coordination  [] ROM  [] Delirium                   [] Motor Control     OT PLAN OF CARE   OT POC based on physician orders, patient diagnosis and results of clinical assessment    Frequency/Duration 1-3 days/wk

## 2023-11-16 LAB
ALBUMIN SERPL-MCNC: 3.4 G/DL (ref 3.5–5.2)
ALP SERPL-CCNC: 44 U/L (ref 40–129)
ALT SERPL-CCNC: 89 U/L (ref 0–40)
ANION GAP SERPL CALCULATED.3IONS-SCNC: 8 MMOL/L (ref 7–16)
AST SERPL-CCNC: 64 U/L (ref 0–39)
BASOPHILS # BLD: 0 K/UL (ref 0–0.2)
BASOPHILS NFR BLD: 0 % (ref 0–2)
BILIRUB SERPL-MCNC: 0.3 MG/DL (ref 0–1.2)
BUN SERPL-MCNC: 21 MG/DL (ref 6–23)
CALCIUM SERPL-MCNC: 8.5 MG/DL (ref 8.6–10.2)
CHLORIDE SERPL-SCNC: 108 MMOL/L (ref 98–107)
CK SERPL-CCNC: 204 U/L (ref 20–200)
CO2 SERPL-SCNC: 23 MMOL/L (ref 22–29)
CREAT SERPL-MCNC: 0.9 MG/DL (ref 0.7–1.2)
CRP SERPL HS-MCNC: 41 MG/L (ref 0–5)
D DIMER: 760 NG/ML DDU (ref 0–232)
EOSINOPHIL # BLD: 0.01 K/UL (ref 0.05–0.5)
EOSINOPHILS RELATIVE PERCENT: 0 % (ref 0–6)
ERYTHROCYTE [DISTWIDTH] IN BLOOD BY AUTOMATED COUNT: 14 % (ref 11.5–15)
ERYTHROCYTE [SEDIMENTATION RATE] IN BLOOD BY WESTERGREN METHOD: 46 MM/HR (ref 0–15)
FERRITIN SERPL-MCNC: 998 NG/ML
FIBRINOGEN PPP-MCNC: 381 MG/DL (ref 200–400)
GFR SERPL CREATININE-BSD FRML MDRD: >60 ML/MIN/1.73M2
GLUCOSE SERPL-MCNC: 92 MG/DL (ref 74–99)
HCT VFR BLD AUTO: 33.4 % (ref 37–54)
HGB BLD-MCNC: 10.9 G/DL (ref 12.5–16.5)
IMM GRANULOCYTES # BLD AUTO: 0.03 K/UL (ref 0–0.58)
IMM GRANULOCYTES NFR BLD: 0 % (ref 0–5)
INR PPP: 9.6
LACTATE BLDV-SCNC: 1 MMOL/L (ref 0.5–2.2)
LDH SERPL-CCNC: 258 U/L (ref 135–225)
LYMPHOCYTES NFR BLD: 1.21 K/UL (ref 1.5–4)
LYMPHOCYTES RELATIVE PERCENT: 18 % (ref 20–42)
MCH RBC QN AUTO: 29.8 PG (ref 26–35)
MCHC RBC AUTO-ENTMCNC: 32.6 G/DL (ref 32–34.5)
MCV RBC AUTO: 91.3 FL (ref 80–99.9)
MONOCYTES NFR BLD: 0.34 K/UL (ref 0.1–0.95)
MONOCYTES NFR BLD: 5 % (ref 2–12)
NEUTROPHILS NFR BLD: 77 % (ref 43–80)
NEUTS SEG NFR BLD: 5.19 K/UL (ref 1.8–7.3)
PLATELET # BLD AUTO: 206 K/UL (ref 130–450)
PMV BLD AUTO: 10.8 FL (ref 7–12)
POTASSIUM SERPL-SCNC: 4.3 MMOL/L (ref 3.5–5)
PROCALCITONIN SERPL-MCNC: 0.07 NG/ML (ref 0–0.08)
PROT SERPL-MCNC: 6.3 G/DL (ref 6.4–8.3)
PROTHROMBIN TIME: 112.5 SEC (ref 9.3–12.4)
RBC # BLD AUTO: 3.66 M/UL (ref 3.8–5.8)
SODIUM SERPL-SCNC: 139 MMOL/L (ref 132–146)
TROPONIN I SERPL HS-MCNC: 18 NG/L (ref 0–11)
WBC OTHER # BLD: 6.8 K/UL (ref 4.5–11.5)

## 2023-11-16 PROCEDURE — 6360000002 HC RX W HCPCS: Performed by: FAMILY MEDICINE

## 2023-11-16 PROCEDURE — 83615 LACTATE (LD) (LDH) ENZYME: CPT

## 2023-11-16 PROCEDURE — 6360000002 HC RX W HCPCS: Performed by: SPECIALIST

## 2023-11-16 PROCEDURE — 80053 COMPREHEN METABOLIC PANEL: CPT

## 2023-11-16 PROCEDURE — 85025 COMPLETE CBC W/AUTO DIFF WBC: CPT

## 2023-11-16 PROCEDURE — 97535 SELF CARE MNGMENT TRAINING: CPT

## 2023-11-16 PROCEDURE — 84145 PROCALCITONIN (PCT): CPT

## 2023-11-16 PROCEDURE — 82728 ASSAY OF FERRITIN: CPT

## 2023-11-16 PROCEDURE — 82550 ASSAY OF CK (CPK): CPT

## 2023-11-16 PROCEDURE — 85384 FIBRINOGEN ACTIVITY: CPT

## 2023-11-16 PROCEDURE — 2700000000 HC OXYGEN THERAPY PER DAY

## 2023-11-16 PROCEDURE — 97110 THERAPEUTIC EXERCISES: CPT

## 2023-11-16 PROCEDURE — 86140 C-REACTIVE PROTEIN: CPT

## 2023-11-16 PROCEDURE — 6370000000 HC RX 637 (ALT 250 FOR IP): Performed by: FAMILY MEDICINE

## 2023-11-16 PROCEDURE — 85379 FIBRIN DEGRADATION QUANT: CPT

## 2023-11-16 PROCEDURE — 85652 RBC SED RATE AUTOMATED: CPT

## 2023-11-16 PROCEDURE — 97530 THERAPEUTIC ACTIVITIES: CPT

## 2023-11-16 PROCEDURE — 84484 ASSAY OF TROPONIN QUANT: CPT

## 2023-11-16 PROCEDURE — 1200000000 HC SEMI PRIVATE

## 2023-11-16 PROCEDURE — 85610 PROTHROMBIN TIME: CPT

## 2023-11-16 PROCEDURE — 36415 COLL VENOUS BLD VENIPUNCTURE: CPT

## 2023-11-16 PROCEDURE — 2580000003 HC RX 258: Performed by: FAMILY MEDICINE

## 2023-11-16 PROCEDURE — 83605 ASSAY OF LACTIC ACID: CPT

## 2023-11-16 RX ADMIN — SODIUM CHLORIDE, PRESERVATIVE FREE 10 ML: 5 INJECTION INTRAVENOUS at 08:32

## 2023-11-16 RX ADMIN — DEXAMETHASONE SODIUM PHOSPHATE 10 MG: 10 INJECTION, SOLUTION INTRAMUSCULAR; INTRAVENOUS at 08:32

## 2023-11-16 RX ADMIN — ATORVASTATIN CALCIUM 20 MG: 20 TABLET, FILM COATED ORAL at 22:09

## 2023-11-16 RX ADMIN — SODIUM CHLORIDE, PRESERVATIVE FREE 10 ML: 5 INJECTION INTRAVENOUS at 22:11

## 2023-11-16 RX ADMIN — LISINOPRIL 5 MG: 5 TABLET ORAL at 22:09

## 2023-11-16 RX ADMIN — SODIUM CHLORIDE, PRESERVATIVE FREE 10 ML: 5 INJECTION INTRAVENOUS at 13:48

## 2023-11-16 RX ADMIN — CLOPIDOGREL BISULFATE 75 MG: 75 TABLET ORAL at 08:11

## 2023-11-16 RX ADMIN — GUAIFENESIN 600 MG: 600 TABLET, EXTENDED RELEASE ORAL at 22:09

## 2023-11-16 RX ADMIN — AZITHROMYCIN MONOHYDRATE 500 MG: 500 INJECTION, POWDER, LYOPHILIZED, FOR SOLUTION INTRAVENOUS at 08:32

## 2023-11-16 RX ADMIN — GUAIFENESIN 600 MG: 600 TABLET, EXTENDED RELEASE ORAL at 08:11

## 2023-11-16 RX ADMIN — BARICITINIB 4 MG: 2 TABLET, FILM COATED ORAL at 08:11

## 2023-11-16 RX ADMIN — SODIUM CHLORIDE, PRESERVATIVE FREE 10 ML: 5 INJECTION INTRAVENOUS at 08:11

## 2023-11-16 RX ADMIN — WATER 1000 MG: 1 INJECTION INTRAMUSCULAR; INTRAVENOUS; SUBCUTANEOUS at 13:48

## 2023-11-16 ASSESSMENT — ENCOUNTER SYMPTOMS
SHORTNESS OF BREATH: 1
VOMITING: 0
NAUSEA: 0

## 2023-11-16 ASSESSMENT — PAIN SCALES - GENERAL: PAINLEVEL_OUTOF10: 0

## 2023-11-16 NOTE — PROGRESS NOTES
Pulse ox was 91% on room air at rest.  Ambulated patient on room air. Oxygen saturation was 96% on room air while ambulating. Oxygen not applied. Recovery pulse ox was 92% on 0 liters of oxygen while ambulating.

## 2023-11-16 NOTE — CARE COORDINATION
SOCIAL WORK / DISCHARGE PLANNING:   Sw discussed case with Dr Regina Norman, possible dc next date. Needs walk test and if qualifies will need script to arrange home O2. Sw spoke with pt's wife via phone and if qualifies, she would choose [facility]. She discussed HHC and wife is agreeable, reviewed [facility], chose [facility]. Referral called to [facility], [facility] rep, await review for acceptance. WILL NEED HHC ORDERS ( RN/PT/OT) PRIOR TO DC. Family will provide home going transport.            Electronically signed by SONAL [place] on 11/16/2023 at 2:49 PM

## 2023-11-16 NOTE — PROGRESS NOTES
OCCUPATIONAL THERAPY TREATMENT NOTE    [facility]   [address]        Date:2023  Patient Name: Sumaya Maria  MRN: [id number]  : 1940  Room: [address] [place] [address]       Evaluating OT: Latashaalok Jazsheila OTR/L; #[id number]      Referring Provider and Specific Provider Orders/Date:      23   OT eval and treat  Start:  23,   End:  23,   ONE TIME,   Standing Count:  1 Occurrences,   R         Elisabeth Aguilar, DO       Placement Recommendation: Subacute Rehab vs home with occupational therapy if patient continues to progress with OT. Diagnosis:   1. COVID    2. Hypoxia    3.  Pneumonia due to infectious organism, unspecified laterality, unspecified part of lung         Surgery: None       Pertinent Medical History:       Past Medical History        Past Medical History:   Diagnosis Date    Cerebral artery occlusion with cerebral infarction ([address])       left side weaker than right    [place] (hard of hearing)      Hx of blood clots      Hyperlipidemia      Hypertension              Past Surgical History         Past Surgical History:   Procedure Laterality Date    FRACTURE SURGERY         hand left    HERNIA REPAIR        HERNIA REPAIR Left 2020     LAPAROSCOPIC LEFT INGUINAL HERNIA REPAIR WITH MESH performed by Tom Doherty MD at [address] [facility]                Precautions:  Fall Risk, COVID-19 Droplet Plus, Up as Tolerated, [place]      Assessment of current deficits:     [x] Functional mobility            [x]ADLs           [x] Strength                   []Cognition    [x] Functional transfers          [x] IADLs          [] Safety Awareness   [x]Endurance    [] Fine Coordination              [x] Balance      [] Vision/perception    []Sensation      []Gross Motor Coordination  [] ROM           [] Delirium                   [] Motor Control      OT PLAN OF CARE   OT POC based on physician orders, patient diagnosis and results of clinical assessment     Frequency/Duration 1-3 days/wk for 2 weeks PRN      Specific OT Treatment Interventions to include:   * Instruction/training on adapted ADL techniques and AE recommendations to increase functional independence within precautions       * Training on energy conservation strategies, correct breathing pattern and techniques to improve independence/tolerance for self-care routine  * Functional transfer/mobility training/DME recommendations for increased independence, safety, and fall prevention  * Therapeutic exercise to improve motor endurance, ROM, and functional strength for ADLs/functional transfers  * Therapeutic activities to facilitate/challenge dynamic balance, stand tolerance for increased safety and independence with ADLs     Recommended Adaptive Equipment: None at this time      Home Living: Patient lives with spouse in a apartment  with 2 steps  to enter home. Walk-in shower.       Equipment owned: [employer] [address] [place] [address],      [facility] Level of Function: Independent with ADLs , Independent with IADLs; ambulated without AD     Driving: Yes  Occupation: Retired     Pain Level: 0/10 pain reported               Cognition: A&O: 3/4; Follows Multiple step directions              Memory: fair               Sequencing: fair               Problem solving: fair               Judgement/safety: fair      AMPAC         AM-PAC Daily Activity - Inpatient   How much help is needed for putting on and taking off regular lower body clothing?: A Little  How much help is needed for bathing (which includes washing, rinsing, drying)?: A Little  How much help is needed for toileting (which includes using toilet, bedpan, or urinal)?: A Little  How much help is needed for putting on and taking off regular upper body clothing?: A Little  How much help is needed for taking care of personal grooming?: A Little  How much help for eating meals?: None  AM-[facility] Inpatient Daily Activity Raw Score:

## 2023-11-16 NOTE — PROGRESS NOTES
Physical Therapy    Physical Therapy Treatment Note/Plan of Care    Room #:  5949/4062-79  Patient Name: Jonah Lamb  YOB: 1940  MRN: [id number]    Date of Service: 11/16/2023     Tentative placement recommendation: Subacute unless patient meets goals then Home Health Physical Therapy  Equipment recommendation: Patient has needed equipment       Evaluating Physical Therapist: Mari Conti, PT  #[id number]      Specific Provider Orders/Date/Referring Provider :     PT eval and treat  Start:  11/14/23 1315,   End:  11/14/23 1315,   ONE TIME,   Standing Count:  1 Occurrences,   R         Mildred Aguilar, DO        Admitting Natalia. a gnosis:   Hypoxia [R09.02]  Acute respiratory failure with hypoxia (HCC) [J96.01]  Pneumonia due to infectious organism, unspecified laterality, unspecified part of lung [J18.9]  COVID [U07.1]  Pneumonia due to COVID-19 virus [U07.1, J12.82]     Admitted with    above , shortness of breath   Surgery: none  Visit Diagnoses         Codes    COVID    -  Primary U07.1    Hypoxia     R09.02    Pneumonia due to infectious organism, unspecified laterality, unspecified part of lung     J18.9            Patient Active Problem List   Diagnosis    Recurrent left inguinal hernia    Acute respiratory failure with hypoxia (HCC)    Pneumonia due to COVID-19 virus        ASSESSMENT of Current Deficits Patient exhibits decreased strength, balance, and endurance impairing functional mobility, transfers, gait , gait distance, and tolerance to activity are barriers to d/c and require skilled intervention to address concerns listed above to increase safety and independence at discharge. Decreased strength, balance and endurance  increases patient's risk for fall. Patient needing minimal assist for walker management in the room due to tight quarters and O2 line management. Pt needing to use the wheeled walker for increased support, stability, and safety during ambulation.  Pt needing rest periods Supervision     Rolling: Independent    Supine to sit:  Independent    Sit to supine: Independent    Scooting: Independent     Transfers Sit to stand: Minimal assist of 1 from bed and chair Sit to stand: Minimal assist of 1 Cues for hand placement and safety     Sit to stand: Independent     Ambulation     2x30 feet using  IV pole with Moderate assist of 1   for balance and cues for safety, pacing, and O2 line management 2 x 30 feet using  wheeled walker with Minimal assist of 1   cues for upright posture, walker approximation, increased base of support, increased step length, safety, and pacing     100 feet using  least restrictive device versus no device with Independent    Stair negotiation: ascended and descended   Not assessed     2 steps, 1 rail, Supervision       ROM Within functional limits    Increase range of motion 10% of affected joints    Strength BUE:   4-/5  RLE:  4-/5  LLE:  4-/5  Increase strength in affected mm groups by 1/3 grade   Balance Sitting EOB:  good    Dynamic Standing:  poor   Sitting EOB: fair +  Dynamic Standing: fair  using a wheeled walker   Sitting EOB:  good    Dynamic Standing: fair       Patient is Alert & Oriented x person, place, time, and situation and follows one step directions  hard of hearing check hearing aide for battery change  Sensation:  Patient  denies numbness/tingling   Edema:  no   Endurance: poor      Vitals:  3 liters nasal cannula   Blood Pressure at rest  Blood Pressure during session    Heart Rate at rest   Heart Rate during session     SPO2 at rest 96%  SPO2 during session 93-95%     Patient education  Patient educated on role of Physical Therapy, risks of immobility, safety and plan of care, importance of positional changes for oxygen exchange,  importance of mobility while in hospital , and O2 line management and safety      Patient response to education:   Pt verbalized understanding Pt demonstrated skill Pt requires further education in this area   Yes

## 2023-11-17 VITALS
SYSTOLIC BLOOD PRESSURE: 137 MMHG | HEIGHT: 69 IN | HEART RATE: 67 BPM | DIASTOLIC BLOOD PRESSURE: 64 MMHG | TEMPERATURE: 98.3 F | OXYGEN SATURATION: 97 % | RESPIRATION RATE: 18 BRPM | WEIGHT: 178.5 LBS | BODY MASS INDEX: 26.44 KG/M2

## 2023-11-17 PROCEDURE — 2580000003 HC RX 258: Performed by: FAMILY MEDICINE

## 2023-11-17 PROCEDURE — 6360000002 HC RX W HCPCS: Performed by: FAMILY MEDICINE

## 2023-11-17 PROCEDURE — 6360000002 HC RX W HCPCS: Performed by: SPECIALIST

## 2023-11-17 PROCEDURE — 6370000000 HC RX 637 (ALT 250 FOR IP): Performed by: FAMILY MEDICINE

## 2023-11-17 PROCEDURE — 97530 THERAPEUTIC ACTIVITIES: CPT

## 2023-11-17 PROCEDURE — 97110 THERAPEUTIC EXERCISES: CPT

## 2023-11-17 RX ORDER — GUAIFENESIN 600 MG/1
600 TABLET, EXTENDED RELEASE ORAL 2 TIMES DAILY
Qty: 20 TABLET | Refills: 0 | Status: SHIPPED | OUTPATIENT
Start: 2023-11-17 | End: 2023-11-27

## 2023-11-17 RX ORDER — DEXAMETHASONE 6 MG/1
6 TABLET ORAL DAILY
Qty: 5 TABLET | Refills: 0 | Status: SHIPPED | OUTPATIENT
Start: 2023-11-17 | End: 2023-11-22

## 2023-11-17 RX ORDER — CLOPIDOGREL BISULFATE 75 MG/1
75 TABLET ORAL DAILY
Qty: 30 TABLET | Refills: 3 | Status: SHIPPED | OUTPATIENT
Start: 2023-11-18

## 2023-11-17 RX ADMIN — SODIUM CHLORIDE, PRESERVATIVE FREE 10 ML: 5 INJECTION INTRAVENOUS at 08:32

## 2023-11-17 RX ADMIN — DEXAMETHASONE SODIUM PHOSPHATE 10 MG: 10 INJECTION, SOLUTION INTRAMUSCULAR; INTRAVENOUS at 08:32

## 2023-11-17 RX ADMIN — AZITHROMYCIN MONOHYDRATE 500 MG: 500 INJECTION, POWDER, LYOPHILIZED, FOR SOLUTION INTRAVENOUS at 08:39

## 2023-11-17 RX ADMIN — BARICITINIB 4 MG: 2 TABLET, FILM COATED ORAL at 08:31

## 2023-11-17 RX ADMIN — GUAIFENESIN 600 MG: 600 TABLET, EXTENDED RELEASE ORAL at 08:31

## 2023-11-17 RX ADMIN — CLOPIDOGREL BISULFATE 75 MG: 75 TABLET ORAL at 08:31

## 2023-11-17 ASSESSMENT — ENCOUNTER SYMPTOMS
VOMITING: 0
NAUSEA: 0

## 2023-11-17 ASSESSMENT — PAIN SCALES - GENERAL: PAINLEVEL_OUTOF10: 0

## 2023-11-17 NOTE — PROGRESS NOTES
Physical Therapy    Physical Therapy Treatment Note/Plan of Care    Room #:  6875/9445-48  Patient Name: Francine Grace  YOB: 1940  MRN: [id number]    Date of Service: 11/17/2023     Tentative placement recommendation: Subacute unless patient meets goals then Home Health Physical Therapy  Equipment recommendation: Patient has needed equipment       Evaluating Physical Therapist: Thiago Coombs, PT  #[id number]      Specific Provider Orders/Date/Referring Provider :     PT eval and treat  Start:  11/14/23 1315,   End:  11/14/23 1315,   ONE TIME,   Standing Count:  1 Occurrences,   Cameron Cruz DO        Admitting Natalia. a gnosis:   Hypoxia [R09.02]  Acute respiratory failure with hypoxia (HCC) [J96.01]  Pneumonia due to infectious organism, unspecified laterality, unspecified part of lung [J18.9]  COVID [U07.1]  Pneumonia due to COVID-19 virus [U07.1, J12.82]     Admitted with    above , shortness of breath   Surgery: none  Visit Diagnoses         Codes    COVID    -  Primary U07.1    Hypoxia     R09.02    Pneumonia due to infectious organism, unspecified laterality, unspecified part of lung     J18.9            Patient Active Problem List   Diagnosis    Recurrent left inguinal hernia    Acute respiratory failure with hypoxia (HCC)    Pneumonia due to COVID-19 virus        ASSESSMENT of Current Deficits Patient exhibits decreased strength, balance, and endurance impairing functional mobility, transfers, gait , gait distance, and tolerance to activity are barriers to d/c and require skilled intervention to address concerns listed above to increase safety and independence at discharge. Decreased strength, balance and endurance  increases patient's risk for fall. Patient needing minimal assist for walker management in the room due to tight quarters. Pt needing to use the wheeled walker for increased support, stability, and safety during ambulation.  Pt needing rest periods throughout tx session due to shortness of breath; cueing for pursed lip breathing. Pt is hard of hearing. PHYSICAL THERAPY  PLAN OF CARE       Physical therapy plan of care is established based on physician order,  patient diagnosis and clinical assessment    Current Treatment Recommendations:    -Bed Mobility: Lower extremity exercises , Upper extremity exercises , and Trunk control activities   -Standing Balance: Perform strengthening exercises in standing to promote motor control with or without upper extremity support  and Challenge balance utilizing reaching  activities beyond center of gravity    -Transfers: Provide instruction on proper hand and foot position for adequate transfer of weight onto lower extremities and use of gait device if needed and Cues for hand placement, technique and safety. Provide stabilization to prevent fall   -Gait: Gait training, Standing activities to improve: base of support, weight shift, weight bearing , Exercises to improve trunk control, and Exercises to improve hip and knee control   -Endurance: Utilize Supervised activities to increase level of endurance to allow for safe functional mobility including transfers and gait  and Use graduated activities to promote good breathing techniques and provide support and education to maximize respiratory function  -Stairs: Stair training with instruction on proper technique and hand placement on rail    PT long term treatment goals are located in below grid    Patient and or family understand(s) diagnosis, prognosis, and plan of care. Frequency of treatments: Patient will be seen  daily.          Prior Level of Function: Patient ambulated independently    Rehab Potential: good   for baseline    Past medical history:   Past Medical History:   Diagnosis Date    Cerebral artery occlusion with cerebral infarction ([address])     left side weaker than right    [place] (hard of hearing)     Hx of blood clots     Hyperlipidemia     Hypertension      Past Surgical

## 2023-11-17 NOTE — CARE COORDINATION
SOCIAL WORK / DISCHARGE PLANNING:   Anticipate dc home today with wife. Pt did not qualify for home O2 when walk test was completed. [facility] has accepted, orders in Epic for RN/PT/OT. Lui lamas is aware. Family will provide home going transport.                Electronically signed by SONAL Licona on 11/17/2023 at 9:47 AM

## 2023-11-17 NOTE — PROGRESS NOTES
CLINICAL PHARMACY NOTE: MEDS TO BEDS    Total # of Prescriptions Filled: 3   The following medications were delivered to the patient:  Dexamethasone 6 mg  Clopidogrel 75 mg  Mucinex 600 mg    Additional Documentation:  Delivered to Heather at desk.

## 2023-11-17 NOTE — PLAN OF CARE
Problem: Discharge Planning  Goal: Discharge to home or other facility with appropriate resources  Outcome: Progressing  Flowsheets (Taken 11/16/2023 2000)  Discharge to home or other facility with appropriate resources:   Identify barriers to discharge with patient and caregiver   Arrange for needed discharge resources and transportation as appropriate   Identify discharge learning needs (meds, wound care, etc)   Refer to discharge planning if patient needs post-hospital services based on physician order or complex needs related to functional status, cognitive ability or social support system     Problem: Safety - Adult  Goal: Free from fall injury  Outcome: Progressing  Flowsheets (Taken 11/16/2023 2000)  Free From Fall Injury: Instruct family/caregiver on patient safety     Problem: Chronic Conditions and Co-morbidities  Goal: Patient's chronic conditions and co-morbidity symptoms are monitored and maintained or improved  Outcome: Progressing  Flowsheets (Taken 11/16/2023 2000)  Care Plan - Patient's Chronic Conditions and Co-Morbidity Symptoms are Monitored and Maintained or Improved:   Monitor and assess patient's chronic conditions and comorbid symptoms for stability, deterioration, or improvement   Collaborate with multidisciplinary team to address chronic and comorbid conditions and prevent exacerbation or deterioration   Update acute care plan with appropriate goals if chronic or comorbid symptoms are exacerbated and prevent overall improvement and discharge     Problem: Pain  Goal: Verbalizes/displays adequate comfort level or baseline comfort level  Outcome: Progressing     Problem: Skin/Tissue Integrity  Goal: Absence of new skin breakdown  Description: 1. Monitor for areas of redness and/or skin breakdown  2. Assess vascular access sites hourly  3. Every 4-6 hours minimum:  Change oxygen saturation probe site  4.   Every 4-6 hours:  If on nasal continuous positive airway pressure, respiratory Review of Systems/Medical History          Cardiovascular  Hyperlipidemia,    Pulmonary  Negative pulmonary ROS        GI/Hepatic    GERD ,  Hiatal hernia,             Endo/Other  Negative endo/other ROS      GYN       Hematology  Negative hematology ROS      Musculoskeletal  Back pain (chronic yennifer pain, sacroilitis) ,   Arthritis     Neurology  Negative neurology ROS      Psychology   Negative psychology ROS Anxiety,              Physical Exam    Airway    Mallampati score: I  TM Distance: >3 FB  Neck ROM: full     Dental   No notable dental hx     Cardiovascular  Rhythm: regular, Rate: normal, Cardiovascular exam normal    Pulmonary  Pulmonary exam normal     Other Findings        Anesthesia Plan  ASA Score- 2     Anesthesia Type- general with ASA Monitors  Additional Monitors:   Airway Plan: LMA  Comment: Patient seen and examined, history reviewed  Patient to be done under general anesthesia with LMA and routine monitors  Risks discussed with the patient, consent obtained        Plan Factors-    Induction- intravenous  Postoperative Plan- Plan for postoperative opioid use  Planned trial extubation    Informed Consent- Anesthetic plan and risks discussed with patient  I personally reviewed this patient with the CRNA  Discussed and agreed on the Anesthesia Plan with the CRNA  Venkat Alves

## 2023-11-18 LAB
MICROORGANISM SPEC CULT: NORMAL
MICROORGANISM SPEC CULT: NORMAL
SERVICE CMNT-IMP: NORMAL
SERVICE CMNT-IMP: NORMAL
SPECIMEN DESCRIPTION: NORMAL
SPECIMEN DESCRIPTION: NORMAL

## 2023-11-24 NOTE — DISCHARGE SUMMARY
Discharge Summary    Date: 11/24/2023  Patient Name: Howie Collier    YOB: 1940     Age: 82 y.o. Admit Date: 11/12/2023  Discharge Date: 11/17/2023  Discharge Condition: Stable    Admission Diagnosis  Hypoxia [R09.02]; Acute respiratory failure with hypoxia ([address]) [J96.01]; Pneumonia due to infectious organism, unspecified laterality, unspecified part of lung [J18.9];COVID [U07.1]; Pneumonia due to COVID-19 virus [U07.1, J12.82]      Discharge Diagnosis  Principal Problem:    Acute respiratory failure with hypoxia (HCC)  Active Problems:    Pneumonia due to COVID-19 virus  Resolved Problems:    * No resolved hospital problems. [facility] Stay  Narrative of Hospital Course:    Patient admitted for acute hypoxic respiratory failure community-acquired pneumonia COPD exacerbation and COVID positive. Patient improved over the course of hospital stay with IV steroids, aerosols, oxygen therapy and antibiotics. Patient was started on barcinitab as well. Improved over course of hospital stay and discharged home in stable condition. Consultants:  IP CONSULT TO INTERNAL MEDICINE  IP CONSULT TO INFECTIOUS DISEASES  IP CONSULT TO PHARMACY  IP CONSULT TO SOCIAL WORK    Surgeries/procedures Performed:  none     Treatments:    Antibiotics, IV Hydration, Steroids and Respiratory Therapy    Ceftriaxone and Azithromycin, Albuterol/Atropine Nebulizer and O2, Solu-Medrol    Discharge Plan/Disposition:  Home    Hospital/Incidental Findings Requiring Follow Up:    Patient Instructions:    Diet: Regular Diet    Activity:Activity as Tolerated  For number of days (if applicable): Other Instructions:    Provider Follow-Up:   No follow-ups on file.      Significant Diagnostic Studies:    Recent Labs:  Admission on 11/12/2023, Discharged on 11/17/2023  WBC                                           Date: 11/12/2023  Value: 6.3         Ref range: 4.5 - 11.5 k/uL    Status: Final  RBC

## 2024-01-03 ENCOUNTER — APPOINTMENT (OUTPATIENT)
Dept: ULTRASOUND IMAGING | Age: 84
End: 2024-01-03
Payer: MEDICARE

## 2024-01-03 ENCOUNTER — HOSPITAL ENCOUNTER (EMERGENCY)
Age: 84
Discharge: HOME | End: 2024-01-03
Attending: STUDENT IN AN ORGANIZED HEALTH CARE EDUCATION/TRAINING PROGRAM
Payer: MEDICARE

## 2024-01-03 VITALS
HEART RATE: 79 BPM | TEMPERATURE: 98 F | DIASTOLIC BLOOD PRESSURE: 63 MMHG | BODY MASS INDEX: 25.31 KG/M2 | RESPIRATION RATE: 18 BRPM | SYSTOLIC BLOOD PRESSURE: 127 MMHG | OXYGEN SATURATION: 96 % | WEIGHT: 171.4 LBS

## 2024-01-03 DIAGNOSIS — I82.4Y9 DEEP VEIN THROMBOSIS (DVT) OF PROXIMAL LOWER EXTREMITY, UNSPECIFIED CHRONICITY, UNSPECIFIED LATERALITY (HCC): Primary | ICD-10-CM

## 2024-01-03 LAB
ANION GAP SERPL CALCULATED.3IONS-SCNC: 11 MMOL/L (ref 7–16)
BASOPHILS # BLD: 0.04 K/UL (ref 0–0.2)
BASOPHILS NFR BLD: 1 % (ref 0–2)
BUN SERPL-MCNC: 18 MG/DL (ref 6–23)
CALCIUM SERPL-MCNC: 9.5 MG/DL (ref 8.6–10.2)
CHLORIDE SERPL-SCNC: 103 MMOL/L (ref 98–107)
CO2 SERPL-SCNC: 25 MMOL/L (ref 22–29)
CREAT SERPL-MCNC: 1 MG/DL (ref 0.7–1.2)
EOSINOPHIL # BLD: 0.21 K/UL (ref 0.05–0.5)
EOSINOPHILS RELATIVE PERCENT: 3 % (ref 0–6)
ERYTHROCYTE [DISTWIDTH] IN BLOOD BY AUTOMATED COUNT: 15.1 % (ref 11.5–15)
GFR SERPL CREATININE-BSD FRML MDRD: >60 ML/MIN/1.73M2
GLUCOSE SERPL-MCNC: 107 MG/DL (ref 74–99)
HCT VFR BLD AUTO: 31.5 % (ref 37–54)
HGB BLD-MCNC: 10.3 G/DL (ref 12.5–16.5)
IMM GRANULOCYTES # BLD AUTO: <0.03 K/UL (ref 0–0.58)
IMM GRANULOCYTES NFR BLD: 0 % (ref 0–5)
LYMPHOCYTES NFR BLD: 1.7 K/UL (ref 1.5–4)
LYMPHOCYTES RELATIVE PERCENT: 27 % (ref 20–42)
MCH RBC QN AUTO: 30.4 PG (ref 26–35)
MCHC RBC AUTO-ENTMCNC: 32.7 G/DL (ref 32–34.5)
MCV RBC AUTO: 92.9 FL (ref 80–99.9)
MONOCYTES NFR BLD: 0.49 K/UL (ref 0.1–0.95)
MONOCYTES NFR BLD: 8 % (ref 2–12)
NEUTROPHILS NFR BLD: 61 % (ref 43–80)
NEUTS SEG NFR BLD: 3.78 K/UL (ref 1.8–7.3)
PLATELET # BLD AUTO: 206 K/UL (ref 130–450)
PMV BLD AUTO: 10 FL (ref 7–12)
POTASSIUM SERPL-SCNC: 4.6 MMOL/L (ref 3.5–5)
RBC # BLD AUTO: 3.39 M/UL (ref 3.8–5.8)
SODIUM SERPL-SCNC: 139 MMOL/L (ref 132–146)
WBC OTHER # BLD: 6.2 K/UL (ref 4.5–11.5)

## 2024-01-03 PROCEDURE — 80048 BASIC METABOLIC PNL TOTAL CA: CPT

## 2024-01-03 PROCEDURE — 93971 EXTREMITY STUDY: CPT

## 2024-01-03 PROCEDURE — 85025 COMPLETE CBC W/AUTO DIFF WBC: CPT

## 2024-01-03 PROCEDURE — 36415 COLL VENOUS BLD VENIPUNCTURE: CPT

## 2024-01-03 PROCEDURE — 99284 EMERGENCY DEPT VISIT MOD MDM: CPT

## 2024-01-03 PROCEDURE — 6370000000 HC RX 637 (ALT 250 FOR IP): Performed by: STUDENT IN AN ORGANIZED HEALTH CARE EDUCATION/TRAINING PROGRAM

## 2024-01-03 RX ADMIN — APIXABAN 10 MG: 5 TABLET, FILM COATED ORAL at 18:16

## 2024-01-03 ASSESSMENT — ENCOUNTER SYMPTOMS
COUGH: 0
NAUSEA: 0
ABDOMINAL PAIN: 0
SHORTNESS OF BREATH: 0
ABDOMINAL DISTENTION: 0
BACK PAIN: 0
VOMITING: 0
CHEST TIGHTNESS: 0
DIARRHEA: 0
PHOTOPHOBIA: 0

## 2024-01-03 ASSESSMENT — LIFESTYLE VARIABLES
HOW MANY STANDARD DRINKS CONTAINING ALCOHOL DO YOU HAVE ON A TYPICAL DAY: PATIENT DOES NOT DRINK
HOW OFTEN DO YOU HAVE A DRINK CONTAINING ALCOHOL: NEVER

## 2024-01-03 NOTE — ED PROVIDER NOTES
Howie Costa is an 83-year-old male present emergency department with concern for swelling to the right leg.  Patient states he has had increasing swelling to the right leg present for 1 week symptoms are usually worse at night and improved slightly by morning.  Patient symptoms have been gradually worsening over 1 week patient denies chest pain, shortness of breath, nausea, vomiting.  Patient did have COVID in November and was treated for also pneumonia patient feels that symptoms have resolved completely.  Patient has not had fever, chills, chest pain, shortness of breath.  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  Patient is not on anticoagulation.  Denies trauma    The history is provided by the patient and medical records.        Review of Systems   Constitutional:  Negative for chills, diaphoresis, fatigue and fever.   Eyes:  Negative for photophobia and visual disturbance.   Respiratory:  Negative for cough, chest tightness and shortness of breath.    Cardiovascular:  Positive for leg swelling. Negative for chest pain and palpitations.   Gastrointestinal:  Negative for abdominal distention, abdominal pain, diarrhea, nausea and vomiting.   Genitourinary:  Negative for dysuria.   Musculoskeletal:  Negative for back pain, neck pain and neck stiffness.   Skin:  Negative for pallor and rash.   Neurological:  Negative for headaches.   Psychiatric/Behavioral:  Negative for confusion.         Physical Exam  Vitals and nursing note reviewed.   Constitutional:       General: He is not in acute distress.     Appearance: Normal appearance. He is not ill-appearing.   HENT:      Head: Normocephalic and atraumatic.   Eyes:      General: No scleral icterus.     Conjunctiva/sclera: Conjunctivae normal.      Pupils: Pupils are equal, round, and reactive to light.   Cardiovascular:      Rate and Rhythm: Normal rate and regular rhythm.      Comments: DP/PT pulses intact RLE  Pulmonary:      Effort:

## 2024-01-03 NOTE — ED PROVIDER NOTES
Department of Emergency Medicine  FIRST PROVIDER TRIAGE NOTE             Independent MLP           1/3/24  2:49 PM EST    Date of Encounter: 1/3/24   MRN: [id number]      HPI: Howie Costa is a 83 y.o. male who presents to the ED for No chief complaint on file.   right leg pain chest discomfort he feels may be from recent pneumonia     ROS: Negative for abd pain or vomiting.    PE: Gen Appearance/Constitutional: alert  CV: regular rate  Pulm: diminished      Initial Plan of Care: All treatment areas with department are currently occupied. Plan to order/Initiate the following while awaiting opening in ED: labs, EKG, and imaging studies.  Initiate Treatment-Testing, Proceed toTreatment Area When Bed Available for ED Attending/MLP to Continue Care    Electronically signed by ESPERANZA Garza   DD: 1/3/24

## 2024-01-14 ENCOUNTER — HOSPITAL ENCOUNTER (OUTPATIENT)
Dept: CT IMAGING | Age: 84
Discharge: HOME | End: 2024-01-14
Attending: FAMILY MEDICINE
Payer: MEDICARE

## 2024-01-14 DIAGNOSIS — R91.8 OTHER NONSPECIFIC ABNORMAL FINDING OF LUNG FIELD: ICD-10-CM

## 2024-01-14 PROCEDURE — 71250 CT THORAX DX C-: CPT

## (undated) DEVICE — SYRINGE MED 10ML TRNSLUC BRL PLUNG BLK MRK POLYPR CTRL

## (undated) DEVICE — COVER,LIGHT HANDLE,FLX,1/PK: Brand: MEDLINE INDUSTRIES, INC.

## (undated) DEVICE — MARKER,SKIN,WI/RULER AND LABELS: Brand: MEDLINE

## (undated) DEVICE — PACK SURG LAP CHOLE CUSTOM

## (undated) DEVICE — CAMERA STRYKER 1488 HD GEN

## (undated) DEVICE — STAPLER INT DIA5MM 25 ABSRB STRP FIX DISP FOR HERN MESH

## (undated) DEVICE — [HIGH FLOW INSUFFLATOR,  DO NOT USE IF PACKAGE IS DAMAGED,  KEEP DRY,  KEEP AWAY FROM SUNLIGHT,  PROTECT FROM HEAT AND RADIOACTIVE SOURCES.]: Brand: PNEUMOSURE

## (undated) DEVICE — LAPAROSCOPIC SCISSORS: Brand: EPIX LAPAROSCOPIC SCISSORS

## (undated) DEVICE — DOUBLE BASIN SET: Brand: MEDLINE INDUSTRIES, INC.

## (undated) DEVICE — NEEDLE HYPO 25GA L1.5IN BLU POLYPR HUB S STL REG BVL STR

## (undated) DEVICE — SET ENDO INSTR LAPAROSCOPIC INCISIONAL

## (undated) DEVICE — PATIENT RETURN ELECTRODE, SINGLE-USE, CONTACT QUALITY MONITORING, ADULT, WITH 9FT CORD, FOR PATIENTS WEIGING OVER 33LBS. (15KG): Brand: MEGADYNE

## (undated) DEVICE — TROCAR ENDOSCP SHFT L150MM DIA8MM TEAL BLDELSS W/ STBL

## (undated) DEVICE — CHLORAPREP 26ML ORANGE

## (undated) DEVICE — GLOVE ORANGE PI 7 1/2   MSG9075

## (undated) DEVICE — TROCAR: Brand: KII FIOS FIRST ENTRY

## (undated) DEVICE — TOWEL,OR,DSP,ST,BLUE,STD,6/PK,12PK/CS: Brand: MEDLINE

## (undated) DEVICE — GARMENT,MEDLINE,DVT,INT,CALF,MED, GEN2: Brand: MEDLINE

## (undated) DEVICE — TROCAR: Brand: KII SLEEVE

## (undated) DEVICE — INSUFFLATION NEEDLE TO ESTABLISH PNEUMOPERITONEUM.: Brand: INSUFFLATION NEEDLE

## (undated) DEVICE — GOWN,SIRUS,NONRNF,SETINSLV,XL,20/CS: Brand: MEDLINE